# Patient Record
Sex: MALE | Race: WHITE | Employment: OTHER | URBAN - METROPOLITAN AREA
[De-identification: names, ages, dates, MRNs, and addresses within clinical notes are randomized per-mention and may not be internally consistent; named-entity substitution may affect disease eponyms.]

---

## 2017-03-15 PROBLEM — M77.8 TENDONITIS OF ELBOW, LEFT: Status: ACTIVE | Noted: 2017-03-15

## 2017-06-03 ENCOUNTER — LAB ENCOUNTER (OUTPATIENT)
Dept: LAB | Age: 67
End: 2017-06-03
Attending: FAMILY MEDICINE
Payer: MEDICARE

## 2017-06-03 DIAGNOSIS — Z13.6 SCREENING FOR HYPERTENSION: Primary | ICD-10-CM

## 2017-06-03 PROCEDURE — 36415 COLL VENOUS BLD VENIPUNCTURE: CPT

## 2017-06-03 PROCEDURE — 80061 LIPID PANEL: CPT

## 2017-06-03 PROCEDURE — 80053 COMPREHEN METABOLIC PANEL: CPT

## 2017-07-19 ENCOUNTER — TELEPHONE (OUTPATIENT)
Dept: FAMILY MEDICINE CLINIC | Facility: CLINIC | Age: 67
End: 2017-07-19

## 2017-07-19 NOTE — TELEPHONE ENCOUNTER
LMOM for pt to either call back or be here 15 min prior to appt to fill out Annual Health Assessment Form (form by daily checklist)

## 2017-07-25 ENCOUNTER — OFFICE VISIT (OUTPATIENT)
Dept: FAMILY MEDICINE CLINIC | Facility: CLINIC | Age: 67
End: 2017-07-25

## 2017-07-25 VITALS
WEIGHT: 150 LBS | RESPIRATION RATE: 14 BRPM | SYSTOLIC BLOOD PRESSURE: 118 MMHG | BODY MASS INDEX: 22.73 KG/M2 | HEART RATE: 72 BPM | HEIGHT: 68 IN | DIASTOLIC BLOOD PRESSURE: 68 MMHG | TEMPERATURE: 99 F

## 2017-07-25 DIAGNOSIS — I10 ESSENTIAL HYPERTENSION: ICD-10-CM

## 2017-07-25 DIAGNOSIS — Z00.00 ENCOUNTER FOR ANNUAL HEALTH EXAMINATION: Primary | ICD-10-CM

## 2017-07-25 DIAGNOSIS — M77.02 MEDIAL EPICONDYLITIS, LEFT: ICD-10-CM

## 2017-07-25 DIAGNOSIS — Z13.31 DEPRESSION SCREENING: ICD-10-CM

## 2017-07-25 DIAGNOSIS — Z23 NEED FOR VACCINATION: ICD-10-CM

## 2017-07-25 PROCEDURE — 90670 PCV13 VACCINE IM: CPT | Performed by: FAMILY MEDICINE

## 2017-07-25 PROCEDURE — G0439 PPPS, SUBSEQ VISIT: HCPCS | Performed by: FAMILY MEDICINE

## 2017-07-25 PROCEDURE — G0009 ADMIN PNEUMOCOCCAL VACCINE: HCPCS | Performed by: FAMILY MEDICINE

## 2017-07-25 PROCEDURE — G0444 DEPRESSION SCREEN ANNUAL: HCPCS | Performed by: FAMILY MEDICINE

## 2017-07-25 RX ORDER — LOSARTAN POTASSIUM 100 MG/1
100 TABLET ORAL DAILY
Qty: 90 TABLET | Refills: 3 | Status: SHIPPED | OUTPATIENT
Start: 2017-07-25 | End: 2017-07-27

## 2017-07-25 NOTE — PATIENT INSTRUCTIONS
Zacarias Walden's SCREENING SCHEDULE   Tests on this list are recommended by your physician but may not be covered, or covered at this frequency, by your insurer. Please check with your insurance carrier before scheduling to verify coverage.     PREVENT abnormal Colonoscopy,10 Years due on 12/15/2026 Update [facility] if applicable    Flex Sigmoidoscopy Screen  Covered every 5 years No results found for this or any previous visit. No flowsheet data found.      Fecal Occult Blood   Covered Annually Medicare does not cover unless Medically needed    Zoster (Not covered by Medicare Part B) No orders found for this or any previous visit.  This may be covered with your pharmacy  prescription benefits     Recommended Websites for Advanced Directives    htt

## 2017-07-25 NOTE — PROGRESS NOTES
HPI:   Noemí Priest is a 69year old male who presents for a Medicare Subsequent Annual Wellness visit (Pt already had Initial Annual Wellness). Preventative Screening  Colonoscopy - 10/2016. Normal.  No repeat needed.     Prostate - h/o cancer - s tablet by mouth daily. cetirizine (ZYRTEC ALLERGY) 10 MG Oral Tab Take 10 mg by mouth daily. MEDICAL INFORMATION:   He  has a past medical history of Cancer ([facility] [address] (4/2015) and Essential hypertension.     He  has a past surgical history that includes age   Head:  Normocephalic, without obvious abnormality, atraumatic   Eyes:  PERRL, conjunctiva/corneas clear, EOM's intact   Nose: Nares normal, septum midline, mucosa normal, no drainage or sinus tenderness   Throat: Lips, mucosa, and tongue normal; teet left  Given how active he is, suggested PT. Pt will pursue this. There is an [facility] near his house that should be able to provide some exercises, treatments for his elbow. If he needs referral, will call back and let me know.               Brian Leobardo Hearing Problems?: No    Vision Problems? : No    Difficulty walking?: No    Difficulty dressing or bathing?: No    Problems with daily activities? : No    Memory Problems?: No      Fall/Risk Assessment     Do you have 3 or more medical conditions?: 1-Ye Physical Exam only, or if medically necessary Electrocardiogram date    Colorectal Cancer Screening      Colonoscopy Screen every 10 years Colonoscopy,10 Years due on 12/15/2026 Update Health Maintenance if applicable    Flex Sigmoidoscopy Screen every 10 data found.

## 2017-07-26 ENCOUNTER — TELEPHONE (OUTPATIENT)
Dept: FAMILY MEDICINE CLINIC | Facility: CLINIC | Age: 67
End: 2017-07-26

## 2017-07-26 DIAGNOSIS — M77.02 MEDIAL EPICONDYLITIS OF ELBOW, LEFT: Primary | ICD-10-CM

## 2017-07-26 NOTE — TELEPHONE ENCOUNTER
Marlene from [place] [address] called requesting PT Orders for elbow.  Please fax orders to [phone]

## 2017-07-26 NOTE — TELEPHONE ENCOUNTER
[facility] called this morning to get PT order. Please sign pended order. Routed to Dr Caesar Kaiser.

## 2017-07-27 ENCOUNTER — TELEPHONE (OUTPATIENT)
Dept: FAMILY MEDICINE CLINIC | Facility: CLINIC | Age: 67
End: 2017-07-27

## 2017-07-27 DIAGNOSIS — I10 ESSENTIAL HYPERTENSION: ICD-10-CM

## 2017-07-27 RX ORDER — LOSARTAN POTASSIUM 100 MG/1
100 TABLET ORAL DAILY
Qty: 90 TABLET | Refills: 1 | Status: SHIPPED | OUTPATIENT
Start: 2017-07-27 | End: 2017-08-03

## 2017-07-27 NOTE — TELEPHONE ENCOUNTER
From: Deni Begum  Sent: 7/27/2017 8:31 AM CDT  Subject: Medication Renewal Request    Sharita Navarro.  Prince Moore would like a refill of the following medications:  losartan 100 MG Oral Tab Aureliano Pace MD]    Preferred pharmacy: [facility] - [place]

## 2017-08-03 DIAGNOSIS — I10 ESSENTIAL HYPERTENSION: ICD-10-CM

## 2017-08-03 RX ORDER — LOSARTAN POTASSIUM 100 MG/1
100 TABLET ORAL DAILY
Qty: 90 TABLET | Refills: 1 | Status: SHIPPED | OUTPATIENT
Start: 2017-08-03 | End: 2018-02-23

## 2017-08-03 NOTE — TELEPHONE ENCOUNTER
Patient was just at the [facility] and they do not have his Medication refilled. Please let him know what to do from here.

## 2017-08-14 ENCOUNTER — PATIENT MESSAGE (OUTPATIENT)
Dept: FAMILY MEDICINE CLINIC | Facility: CLINIC | Age: 67
End: 2017-08-14

## 2017-08-14 DIAGNOSIS — M77.00 GOLFER'S ELBOW, UNSPECIFIED LATERALITY: Primary | ICD-10-CM

## 2017-08-14 NOTE — TELEPHONE ENCOUNTER
From: Cody Abraham  To: Idell Klinefelter, MD  Sent: 8/14/2017 12:58 PM CDT  Subject: Non-Urgent Medical Question    August 14, 2017    Dr. Rebekah Angeles,  I am going to [facility] for physical therapy on my left elbow (golfer's elbow - interior) per y

## 2017-09-01 PROBLEM — M77.02 MEDIAL EPICONDYLITIS OF LEFT ELBOW: Status: ACTIVE | Noted: 2017-09-01

## 2017-11-13 ENCOUNTER — TELEPHONE (OUTPATIENT)
Dept: FAMILY MEDICINE CLINIC | Facility: CLINIC | Age: 67
End: 2017-11-13

## 2017-11-14 ENCOUNTER — OFFICE VISIT (OUTPATIENT)
Dept: FAMILY MEDICINE CLINIC | Facility: CLINIC | Age: 67
End: 2017-11-14

## 2017-11-14 VITALS
DIASTOLIC BLOOD PRESSURE: 80 MMHG | HEIGHT: 68.25 IN | WEIGHT: 154 LBS | SYSTOLIC BLOOD PRESSURE: 128 MMHG | HEART RATE: 60 BPM | RESPIRATION RATE: 16 BRPM | BODY MASS INDEX: 23.34 KG/M2 | TEMPERATURE: 98 F

## 2017-11-14 DIAGNOSIS — M62.830 BACK MUSCLE SPASM: Primary | ICD-10-CM

## 2017-11-14 PROCEDURE — 99213 OFFICE O/P EST LOW 20 MIN: CPT | Performed by: FAMILY MEDICINE

## 2017-11-14 RX ORDER — CYCLOBENZAPRINE HCL 10 MG
10 TABLET ORAL NIGHTLY
Qty: 30 TABLET | Refills: 0 | Status: SHIPPED | OUTPATIENT
Start: 2017-11-14 | End: 2017-12-04

## 2017-11-14 RX ORDER — INFLUENZA A VIRUSA/MICHIGAN/45/2015 X-275 (H1N1) ANTIGEN (FORMALDEHYDE INACTIVATED), INFLUENZA A VIRUS A/HONG KONG/4801/2014 X-263B (H3N2) ANTIGEN (FORMALDEHYDE INACTIVATED), AND INFLUENZA B VIRUS B/BRISBANE/60/2008 ANTIGEN (FORMALDEHYDE INACTIVATED) 60; 60; 60 UG/.5ML; UG/.5ML; UG/.5ML
INJECTION, SUSPENSION INTRAMUSCULAR
COMMUNITY
Start: 2017-09-21 | End: 2017-11-14

## 2017-11-14 NOTE — PROGRESS NOTES
Patient presents with:  Low Back Pain: x 2 weeks, pain in lower back concentrated on the left side, pain is constant but worse in the morning     HPI:   Ambrosio Rivera is a 69year old male who presents to the office for LBP.   About 2 and a half weeks a

## 2018-01-08 ENCOUNTER — HOSPITAL ENCOUNTER (OUTPATIENT)
Dept: MRI IMAGING | Age: 68
Discharge: HOME | End: 2018-01-08
Attending: PHYSICIAN ASSISTANT
Payer: MEDICARE

## 2018-01-08 DIAGNOSIS — M43.16 SPONDYLOLISTHESIS OF LUMBAR REGION: ICD-10-CM

## 2018-01-08 DIAGNOSIS — M47.816 LUMBAR ARTHROPATHY: ICD-10-CM

## 2018-01-08 PROCEDURE — 72148 MRI LUMBAR SPINE W/O DYE: CPT | Performed by: PHYSICIAN ASSISTANT

## 2018-01-18 PROBLEM — M71.38 SYNOVIAL CYST OF LUMBAR FACET JOINT: Status: ACTIVE | Noted: 2018-01-18

## 2018-01-18 PROBLEM — M47.816 LUMBAR SPONDYLOSIS: Status: ACTIVE | Noted: 2018-01-18

## 2018-01-27 ENCOUNTER — NURSE ONLY (OUTPATIENT)
Dept: FAMILY MEDICINE CLINIC | Facility: CLINIC | Age: 68
End: 2018-01-27

## 2018-01-27 VITALS
WEIGHT: 152 LBS | HEIGHT: 68 IN | SYSTOLIC BLOOD PRESSURE: 120 MMHG | TEMPERATURE: 98 F | RESPIRATION RATE: 18 BRPM | HEART RATE: 69 BPM | BODY MASS INDEX: 23.04 KG/M2 | OXYGEN SATURATION: 98 % | DIASTOLIC BLOOD PRESSURE: 72 MMHG

## 2018-01-27 DIAGNOSIS — J01.01 ACUTE RECURRENT MAXILLARY SINUSITIS: Primary | ICD-10-CM

## 2018-01-27 PROCEDURE — 99213 OFFICE O/P EST LOW 20 MIN: CPT | Performed by: PHYSICIAN ASSISTANT

## 2018-01-27 RX ORDER — METHYLPREDNISOLONE 4 MG/1
TABLET ORAL
Qty: 1 KIT | Refills: 0 | Status: SHIPPED | OUTPATIENT
Start: 2018-01-27 | End: 2018-06-27

## 2018-01-27 RX ORDER — AMOXICILLIN AND CLAVULANATE POTASSIUM 875; 125 MG/1; MG/1
1 TABLET, FILM COATED ORAL 2 TIMES DAILY
Qty: 20 TABLET | Refills: 0 | Status: SHIPPED | OUTPATIENT
Start: 2018-01-27 | End: 2018-02-06

## 2018-01-27 NOTE — PATIENT INSTRUCTIONS
1. Augmentin 875 mg twice daily for 10 days. Recommend probiotics while on this medication to prevent antibiotics associated diarrhea or yeast infections.   Examples include yogurt with active live cultures; or in capsule/granule forms such as Florastor, C · An expectorant containing guaifenesin may help thin the mucus and promote drainage from the sinuses. · Over-the-counter decongestants may be used unless a similar medicine was prescribed.  Nasal sprays work the fastest. Use one that contains phenylephrin © 6042-6356 The [address]. [address] [facility], [facility] [place]. All rights reserved. This information is not intended as a substitute for professional medical care. Always follow your healthcare professional's instructions.

## 2018-01-27 NOTE — PROGRESS NOTES
CHIEF COMPLAINT:   Patient presents with:  Sinus Problem: sinus pain and pressure x 1 week      HPI:   Verda Boxer is a 69year old male who presents for cold symptoms for  1  weeks.  Symptoms have progressed into sinus congestion and been worsening s Alcohol use: Yes           0.6 oz/week     Standard drinks or equivalent: 1 per week     Comment: 1 glass of wine a week        REVIEW OF SYSTEMS:   GENERAL:  denies  appetite  SKIN: no rashes or abnormal skin lesions  HEENT: See HPI.     LUNGS: denies shor Sig: As directed. Risks, benefits, side effects of medication addressed and explained. Patient Instructions   1. Augmentin 875 mg twice daily for 10 days.   Recommend probiotics while on this medication to prevent antibiotics associated diar · Heat may help soothe painful areas of the face. Use a towel soaked in hot water. Or,  the shower and direct the hot spray onto your face.  Using a vaporizer along with a menthol rub at night may also help.   · An expectorant containing guaifenesin · Vision problems, including blurred or double vision  · Fever of 100.4ºF (38ºC) or higher, or as directed by your healthcare provider  · Seizure  · Breathing problems  · Symptoms not resolving within 10 days  Date Last Reviewed: 4/13/2015  © 8057-3191 The

## 2018-02-08 ENCOUNTER — APPOINTMENT (OUTPATIENT)
Dept: PHYSICAL THERAPY | Age: 68
End: 2018-02-08
Payer: MEDICARE

## 2018-02-12 ENCOUNTER — OFFICE VISIT (OUTPATIENT)
Dept: PHYSICAL THERAPY | Age: 68
End: 2018-02-12
Attending: PHYSICIAN ASSISTANT
Payer: MEDICARE

## 2018-02-12 DIAGNOSIS — M43.16 SPONDYLOLISTHESIS OF LUMBAR REGION: ICD-10-CM

## 2018-02-12 DIAGNOSIS — M47.816 LUMBAR ARTHROPATHY: ICD-10-CM

## 2018-02-12 PROCEDURE — 97110 THERAPEUTIC EXERCISES: CPT

## 2018-02-12 PROCEDURE — 97161 PT EVAL LOW COMPLEX 20 MIN: CPT

## 2018-02-12 NOTE — PROGRESS NOTES
SPINE EVALUATION:   Referring Physician: Dr. Dyana Robbins  Diagnosis: left sided low back pain     Date of Service: 2/12/2018     PATIENT Jeison Newberry is a 69year old y/o male who presents to therapy today with complaints of left sided low back to address the above impairments to decrease pain, improve hip musculature flexibility, increase hip strength, improve neural tension flexibility.      Precautions:  None  OBJECTIVE:   Observation/Posture: he is an alert and oriented male, erect posture  Ga side  4) patient to demo 25 deg standing lumbar extension with less than 2/10 pain    Frequency / Duration: Patient will be seen for 2 x/week or a total of 8 visits over a 90 day period. Treatment will include: Manual Therapy; Therapeutic Exercises;  Neurom

## 2018-02-14 ENCOUNTER — OFFICE VISIT (OUTPATIENT)
Dept: PHYSICAL THERAPY | Age: 68
End: 2018-02-14
Attending: FAMILY MEDICINE
Payer: MEDICARE

## 2018-02-14 PROCEDURE — 97110 THERAPEUTIC EXERCISES: CPT

## 2018-02-14 PROCEDURE — 97140 MANUAL THERAPY 1/> REGIONS: CPT

## 2018-02-14 NOTE — PROGRESS NOTES
Dx: left sided mechanical low back pain, extension biased with neural tendion         Authorized # of Visits:  8         Next MD visit: none scheduled  Fall Risk: standard         Precautions: n/a             Subjective: feeling good, no pain today.  Jacy Reveles

## 2018-02-19 ENCOUNTER — OFFICE VISIT (OUTPATIENT)
Dept: PHYSICAL THERAPY | Age: 68
End: 2018-02-19
Attending: PHYSICIAN ASSISTANT
Payer: MEDICARE

## 2018-02-19 PROCEDURE — 97110 THERAPEUTIC EXERCISES: CPT

## 2018-02-19 PROCEDURE — 97140 MANUAL THERAPY 1/> REGIONS: CPT

## 2018-02-19 NOTE — PROGRESS NOTES
Dx: left sided mechanical low back pain, extension biased with neural tendion         Authorized # of Visits:  8         Next MD visit: none scheduled  Fall Risk: standard         Precautions: n/a           Subjective: feeling good, no pain today.  Wang o

## 2018-02-21 ENCOUNTER — OFFICE VISIT (OUTPATIENT)
Dept: PHYSICAL THERAPY | Age: 68
End: 2018-02-21
Attending: FAMILY MEDICINE
Payer: MEDICARE

## 2018-02-21 PROCEDURE — 97140 MANUAL THERAPY 1/> REGIONS: CPT

## 2018-02-21 PROCEDURE — 97110 THERAPEUTIC EXERCISES: CPT

## 2018-02-21 NOTE — PROGRESS NOTES
Dx: left sided mechanical low back pain, extension biased with neural tendion         Authorized # of Visits:  8         Next MD visit: none scheduled  Fall Risk: standard         Precautions: n/a           Subjective: feeling good, no pain today.  Doing th Total Timed Treatment: 40 min  Total Treatment Time: 40 plus 10 min [facility]

## 2018-02-23 DIAGNOSIS — I10 ESSENTIAL HYPERTENSION: ICD-10-CM

## 2018-02-23 RX ORDER — LOSARTAN POTASSIUM 100 MG/1
100 TABLET ORAL DAILY
Qty: 90 TABLET | Refills: 1 | Status: SHIPPED
Start: 2018-02-23 | End: 2018-07-27

## 2018-02-23 NOTE — TELEPHONE ENCOUNTER
From: Sergey Moran  Sent: 2/23/2018 10:41 AM CST  Subject: Medication Renewal Request    Tosha Bryant.  Nhung Pitts would like a refill of the following medications:     losartan 100 MG Oral Tab Wolf Singh MD]    Preferred pharmacy: [facility] - [place]

## 2018-02-26 ENCOUNTER — APPOINTMENT (OUTPATIENT)
Dept: PHYSICAL THERAPY | Age: 68
End: 2018-02-26
Payer: MEDICARE

## 2018-03-05 ENCOUNTER — OFFICE VISIT (OUTPATIENT)
Dept: PHYSICAL THERAPY | Age: 68
End: 2018-03-05
Attending: PHYSICIAN ASSISTANT
Payer: MEDICARE

## 2018-03-05 PROCEDURE — 97140 MANUAL THERAPY 1/> REGIONS: CPT

## 2018-03-05 PROCEDURE — 97110 THERAPEUTIC EXERCISES: CPT

## 2018-03-05 NOTE — PROGRESS NOTES
Dx: left sided mechanical low back pain, extension biased with neural tendion         Authorized # of Visits:  8         Next MD visit: none scheduled  Fall Risk: standard         Precautions: n/a           Subjective: he reports had been feeling great, 1/ TX#: 2/ Date:  2/19/2018               TX#: 3/   Date: 2/21/2018             TX#: 4/ Date: 3/5/2018              TX#: 5/ Date:               TX#: 6/ Date:               TX#: 7/ Date:               TX#: 8/   TM 3.0mph  3% incline  5 min --- Bike 5 min L3 hi

## 2018-05-03 ENCOUNTER — TELEPHONE (OUTPATIENT)
Dept: FAMILY MEDICINE CLINIC | Facility: CLINIC | Age: 68
End: 2018-05-03

## 2018-05-03 NOTE — TELEPHONE ENCOUNTER
Spoke with pt and he was in the driveway waiting for his grandchildren to get off the bus he will call back to schedule.

## 2018-06-27 ENCOUNTER — OFFICE VISIT (OUTPATIENT)
Dept: FAMILY MEDICINE CLINIC | Facility: CLINIC | Age: 68
End: 2018-06-27

## 2018-06-27 VITALS
OXYGEN SATURATION: 98 % | DIASTOLIC BLOOD PRESSURE: 88 MMHG | WEIGHT: 151 LBS | RESPIRATION RATE: 20 BRPM | BODY MASS INDEX: 22.62 KG/M2 | SYSTOLIC BLOOD PRESSURE: 138 MMHG | TEMPERATURE: 99 F | HEART RATE: 68 BPM | HEIGHT: 68.5 IN

## 2018-06-27 DIAGNOSIS — J01.00 ACUTE NON-RECURRENT MAXILLARY SINUSITIS: Primary | ICD-10-CM

## 2018-06-27 PROCEDURE — 99213 OFFICE O/P EST LOW 20 MIN: CPT | Performed by: PHYSICIAN ASSISTANT

## 2018-06-27 RX ORDER — METHYLPREDNISOLONE 4 MG/1
TABLET ORAL
Qty: 1 KIT | Refills: 0 | Status: SHIPPED | OUTPATIENT
Start: 2018-06-27 | End: 2018-07-27

## 2018-06-27 RX ORDER — AMOXICILLIN AND CLAVULANATE POTASSIUM 875; 125 MG/1; MG/1
1 TABLET, FILM COATED ORAL 2 TIMES DAILY
Qty: 20 TABLET | Refills: 0 | Status: SHIPPED | OUTPATIENT
Start: 2018-06-27 | End: 2018-07-07

## 2018-06-27 NOTE — PROGRESS NOTES
CHIEF COMPLAINT:   Patient presents with:  Sinus Problem: s/s for 7 days. used OTC meds      HPI:   Masha Weston is a 77year old male who presents for cold symptoms for  7  days. He feels like he is getting a sinus infection.   History of chronic sin Arthritis Neg    • Anesthesia Problems  Neg       Smoking status: Never Smoker                                                              Smokeless tobacco: Never Used                      Alcohol use: Yes           0.6 oz/week     Standard drinks or equ Pack 1 kit 0      Sig: As directed. Amoxicillin-Pot Clavulanate 875-125 MG Oral Tab 20 tablet 0      Sig: Take 1 tablet by mouth 2 (two) times daily. Risks, benefits, side effects of medication addressed and explained.     There are no Patien

## 2018-07-23 ENCOUNTER — TELEPHONE (OUTPATIENT)
Dept: FAMILY MEDICINE CLINIC | Facility: CLINIC | Age: 68
End: 2018-07-23

## 2018-07-27 ENCOUNTER — OFFICE VISIT (OUTPATIENT)
Dept: FAMILY MEDICINE CLINIC | Facility: CLINIC | Age: 68
End: 2018-07-27
Payer: MEDICARE

## 2018-07-27 VITALS — HEART RATE: 72 BPM | DIASTOLIC BLOOD PRESSURE: 70 MMHG | SYSTOLIC BLOOD PRESSURE: 126 MMHG | TEMPERATURE: 98 F

## 2018-07-27 DIAGNOSIS — R23.9 SKIN CHANGE: ICD-10-CM

## 2018-07-27 DIAGNOSIS — I10 ESSENTIAL HYPERTENSION: ICD-10-CM

## 2018-07-27 DIAGNOSIS — Z13.6 SCREENING FOR CARDIOVASCULAR CONDITION: ICD-10-CM

## 2018-07-27 DIAGNOSIS — Z13.31 DEPRESSION SCREENING: ICD-10-CM

## 2018-07-27 DIAGNOSIS — Z11.59 NEED FOR HEPATITIS C SCREENING TEST: ICD-10-CM

## 2018-07-27 DIAGNOSIS — C61 PROSTATE CANCER (HCC): ICD-10-CM

## 2018-07-27 DIAGNOSIS — Z00.00 ENCOUNTER FOR ANNUAL HEALTH EXAMINATION: Primary | ICD-10-CM

## 2018-07-27 PROCEDURE — G0439 PPPS, SUBSEQ VISIT: HCPCS | Performed by: FAMILY MEDICINE

## 2018-07-27 PROCEDURE — G0444 DEPRESSION SCREEN ANNUAL: HCPCS | Performed by: FAMILY MEDICINE

## 2018-07-27 RX ORDER — LOSARTAN POTASSIUM 100 MG/1
100 TABLET ORAL DAILY
Qty: 90 TABLET | Refills: 3 | Status: SHIPPED | OUTPATIENT
Start: 2018-07-27 | End: 2019-07-18

## 2018-07-27 NOTE — PATIENT INSTRUCTIONS
Charmaine Walden's SCREENING SCHEDULE   Tests on this list are recommended by your physician but may not be covered, or covered at this frequency, by your insurer. Please check with your insurance carrier before scheduling to verify coverage.     PREVENT abnormal There are no preventive care reminders to display for this patient. Update Health Maintenance if applicable    Flex Sigmoidoscopy Screen  Covered every 5 years No results found for this or any previous visit. No flowsheet data found.      Fecal Occ prescription benefits, but Medicare does not cover unless Medically needed    Zoster (Not covered by Medicare Part B) No orders found for this or any previous visit.  This may be covered with your pharmacy  prescription benefits     Recommended Websites for

## 2018-07-27 NOTE — PROGRESS NOTES
HPI:   Nasreen Greenberg is a 77year old male who presents for a Medicare Subsequent Annual Wellness visit (Pt already had Initial Annual Wellness). Preventative Screening  Colonoscopy - 10/2016. No repeat recommended.    Prostate - h/o cancer - sees u sinusitis     PND (post-nasal drip)     Prostate cancer (HCC)     HTN (hypertension)     Sprain of right sternoclavicular joint, subsequent encounter     Chronic right shoulder pain     Internal hemorrhoids     Tendonitis of elbow, left     Medial epicondy mouth, throat, and face: negative  Respiratory: negative  Cardiovascular: negative  Gastrointestinal: negative  Genitourinary: negative  Neurological: negative      EXAM:   /70   Pulse 72   Temp 98.2 °F (36.8 °C) (Oral)   Estimated body mass index is History   Administered Date(s) Administered   • Celestone Soluspan 3mg  03/15/2017   • Depo-Medrol 40mg Inj 09/01/2017, 02/22/2018   • Influenza Vaccine, High Dose, Preserv Free 10/13/2015, 09/27/2017   • Pneumococcal (Prevnar 13) 07/25/2017   • Pneumovax Family    This section provided for quick review of chart, separate sheet to patient  [address] Internal Lab or Procedure External Lab or Procedure   Diabetes Screening      HbgA1C   Annually No results found for: A1C TDaP Not covered by Medicare Part B) No vaccine history found This may be covered with your prescription benefits, but Medicare does not cover unless Medically needed    Zoster   Not covered by Medicare Part B No vaccine history found This may be covered w

## 2018-07-31 ENCOUNTER — APPOINTMENT (OUTPATIENT)
Dept: LAB | Facility: HOSPITAL | Age: 68
End: 2018-07-31
Attending: FAMILY MEDICINE
Payer: MEDICARE

## 2018-07-31 PROCEDURE — 80061 LIPID PANEL: CPT | Performed by: FAMILY MEDICINE

## 2018-07-31 PROCEDURE — 80053 COMPREHEN METABOLIC PANEL: CPT | Performed by: FAMILY MEDICINE

## 2018-07-31 PROCEDURE — 86803 HEPATITIS C AB TEST: CPT | Performed by: FAMILY MEDICINE

## 2019-01-07 ENCOUNTER — OFFICE VISIT (OUTPATIENT)
Dept: FAMILY MEDICINE CLINIC | Facility: CLINIC | Age: 69
End: 2019-01-07
Payer: MEDICARE

## 2019-01-07 VITALS
HEART RATE: 52 BPM | OXYGEN SATURATION: 98 % | TEMPERATURE: 98 F | SYSTOLIC BLOOD PRESSURE: 122 MMHG | DIASTOLIC BLOOD PRESSURE: 82 MMHG | WEIGHT: 155 LBS | HEIGHT: 68 IN | BODY MASS INDEX: 23.49 KG/M2 | RESPIRATION RATE: 20 BRPM

## 2019-01-07 DIAGNOSIS — J01.00 ACUTE MAXILLARY SINUSITIS, RECURRENCE NOT SPECIFIED: Primary | ICD-10-CM

## 2019-01-07 PROCEDURE — 99213 OFFICE O/P EST LOW 20 MIN: CPT | Performed by: PHYSICIAN ASSISTANT

## 2019-01-07 RX ORDER — AMOXICILLIN AND CLAVULANATE POTASSIUM 875; 125 MG/1; MG/1
1 TABLET, FILM COATED ORAL 2 TIMES DAILY
Qty: 20 TABLET | Refills: 0 | Status: SHIPPED | OUTPATIENT
Start: 2019-01-07 | End: 2019-01-17

## 2019-01-07 NOTE — PROGRESS NOTES
CHIEF COMPLAINT:   Patient presents with:  Sinus Problem: s/s for 7 days. OTC meds taken    HPI:   Carlyle Bloom is a 77year old male who presents for sinus congestion for  7  days. Symptoms have been worsening since onset.  Sinus congestion/pain is d LUNGS: denies shortness of breath or wheezing, See HPI  CARDIOVASCULAR: denies chest pain or palpitations   GI: denies constipation or abdominal pain  NEURO: No numbness or tingling in face.     EXAM:   /82   Pulse 52   Temp 97.5 °F (36.4 °C) ([place] Sinusitis can often be managed with self-care. Self-care can keep sinuses moist and make you feel more comfortable. Remember to follow your doctor's instructions closely. This can make a big difference in getting your sinus problem under control.   Drink fl Colds, flu, and allergies make it more likely for you to get sinusitis. Do your best to prevent sinusitis by preventing these problems. Do what you can to avoid getting colds and other infections. Stay away from things that cause allergies (allergens).  Teresa Ann · Stay away from all types of smoke, which dries out sinus linings. This includes tobacco smoke and chemical smoke in workplace settings. · Use saltwater rinses. Date Last Reviewed: 10/1/2016  © 2365-1907 The [address].  [facility] · You can use an over-the-counter decongestant, unless a similar medicine was prescribed to you. Nasal sprays work the fastest. Use one that contains phenylephrine or oxymetazoline. First blow your nose gently. Then use the spray.  Do not use these medicine · Don’t have close contact with people who have sore throats, colds, or other upper respiratory infections. · Don’t smoke, and stay away from secondhand smoke. · Stay up to date with of your vaccines.   Date Last Reviewed: 11/1/2017  © 5086-6345 The [employer]

## 2019-01-07 NOTE — PATIENT INSTRUCTIONS
Self-Care for Sinusitis     Drinking plenty of water can help sinuses drain. Sinusitis can often be managed with self-care. Self-care can keep sinuses moist and make you feel more comfortable. Remember to follow your doctor's instructions closely.  This Colds, flu, and allergies make it more likely for you to get sinusitis. Do your best to prevent sinusitis by preventing these problems. Do what you can to avoid getting colds and other infections. Stay away from things that cause allergies (allergens).  Nora Lucas · Stay away from all types of smoke, which dries out sinus linings. This includes tobacco smoke and chemical smoke in workplace settings. · Use saltwater rinses. Date Last Reviewed: 10/1/2016  © 8458-0231 The [address].  [facility] · You can use an over-the-counter decongestant, unless a similar medicine was prescribed to you. Nasal sprays work the fastest. Use one that contains phenylephrine or oxymetazoline. First blow your nose gently. Then use the spray.  Do not use these medicine · Don’t have close contact with people who have sore throats, colds, or other upper respiratory infections. · Don’t smoke, and stay away from secondhand smoke. · Stay up to date with of your vaccines.   Date Last Reviewed: 11/1/2017  © 4537-9911 The [employer]

## 2019-03-11 PROCEDURE — 81015 MICROSCOPIC EXAM OF URINE: CPT | Performed by: UROLOGY

## 2019-04-04 ENCOUNTER — OFFICE VISIT (OUTPATIENT)
Dept: FAMILY MEDICINE CLINIC | Facility: CLINIC | Age: 69
End: 2019-04-04
Payer: MEDICARE

## 2019-04-04 VITALS
HEIGHT: 68.5 IN | RESPIRATION RATE: 14 BRPM | WEIGHT: 157 LBS | DIASTOLIC BLOOD PRESSURE: 80 MMHG | SYSTOLIC BLOOD PRESSURE: 148 MMHG | TEMPERATURE: 98 F | HEART RATE: 72 BPM | BODY MASS INDEX: 23.52 KG/M2

## 2019-04-04 DIAGNOSIS — B02.9 HERPES ZOSTER WITHOUT COMPLICATION: Primary | ICD-10-CM

## 2019-04-04 PROCEDURE — 99213 OFFICE O/P EST LOW 20 MIN: CPT | Performed by: FAMILY MEDICINE

## 2019-04-04 RX ORDER — GABAPENTIN 300 MG/1
300 CAPSULE ORAL 3 TIMES DAILY PRN
Qty: 90 CAPSULE | Refills: 1 | Status: SHIPPED | OUTPATIENT
Start: 2019-04-04 | End: 2019-07-18

## 2019-04-04 RX ORDER — VALACYCLOVIR HYDROCHLORIDE 1 G/1
1 TABLET, FILM COATED ORAL 3 TIMES DAILY
Qty: 21 TABLET | Refills: 0 | Status: SHIPPED | OUTPATIENT
Start: 2019-04-04 | End: 2019-04-11

## 2019-04-04 RX ORDER — LIDOCAINE 50 MG/G
2 PATCH TOPICAL EVERY 24 HOURS
Qty: 60 PATCH | Refills: 1 | Status: SHIPPED | OUTPATIENT
Start: 2019-04-04 | End: 2019-05-31

## 2019-04-04 NOTE — PROGRESS NOTES
Patient presents with:  Back Pain: right side pt unable to sleep   Rash      Subjective   HPI:   This is a 77year old male coming in for pain, no prior kidney stones, but 3 days of pain, initially mild. Gradually worse, but severe last night. Right flank. Abdominal: Soft. Normal appearance and bowel sounds are normal. He exhibits no distension. There is no hepatosplenomegaly. There is no tenderness. There is no rebound, no guarding, no CVA tenderness and negative Mosley's sign.    Neurological: He is alert a

## 2019-04-23 PROBLEM — S76.111A STRAIN OF RIGHT QUADRICEPS MUSCLE: Status: ACTIVE | Noted: 2019-04-23

## 2019-04-23 PROBLEM — M76.891 PES ANSERINUS TENDINITIS OF RIGHT LOWER EXTREMITY: Status: ACTIVE | Noted: 2019-04-23

## 2019-05-01 PROBLEM — M25.561 RIGHT KNEE PAIN, UNSPECIFIED CHRONICITY: Status: ACTIVE | Noted: 2019-05-01

## 2019-05-04 ENCOUNTER — PATIENT MESSAGE (OUTPATIENT)
Dept: FAMILY MEDICINE CLINIC | Facility: CLINIC | Age: 69
End: 2019-05-04

## 2019-05-04 DIAGNOSIS — M54.50 CHRONIC LOW BACK PAIN, UNSPECIFIED BACK PAIN LATERALITY, UNSPECIFIED WHETHER SCIATICA PRESENT: Primary | ICD-10-CM

## 2019-05-04 DIAGNOSIS — G89.29 CHRONIC LOW BACK PAIN, UNSPECIFIED BACK PAIN LATERALITY, UNSPECIFIED WHETHER SCIATICA PRESENT: Primary | ICD-10-CM

## 2019-05-06 NOTE — TELEPHONE ENCOUNTER
From: Fredy Blanca  To: Sally Perea MD  Sent: 5/4/2019 7:30 PM CDT  Subject: Visit Follow-up Question    I am still having problem with my lower back even after PT and Chiro.  I made an appointment with [address] [place] [address] but the doctor I was going to

## 2019-05-21 PROBLEM — M23.91 INTERNAL DERANGEMENT OF KNEE, RIGHT: Status: ACTIVE | Noted: 2019-05-21

## 2019-05-31 ENCOUNTER — OFFICE VISIT (OUTPATIENT)
Dept: FAMILY MEDICINE CLINIC | Facility: CLINIC | Age: 69
End: 2019-05-31
Payer: MEDICARE

## 2019-05-31 VITALS
HEART RATE: 72 BPM | WEIGHT: 155 LBS | RESPIRATION RATE: 16 BRPM | TEMPERATURE: 98 F | DIASTOLIC BLOOD PRESSURE: 74 MMHG | SYSTOLIC BLOOD PRESSURE: 124 MMHG | BODY MASS INDEX: 23.22 KG/M2 | HEIGHT: 68.5 IN

## 2019-05-31 DIAGNOSIS — B02.29 HZV (HERPES ZOSTER VIRUS) POST HERPETIC NEURALGIA: Primary | ICD-10-CM

## 2019-05-31 PROCEDURE — 99213 OFFICE O/P EST LOW 20 MIN: CPT | Performed by: FAMILY MEDICINE

## 2019-05-31 RX ORDER — LIDOCAINE 50 MG/G
2 PATCH TOPICAL EVERY 24 HOURS
Qty: 60 PATCH | Refills: 1 | Status: SHIPPED | OUTPATIENT
Start: 2019-05-31 | End: 2020-08-19

## 2019-05-31 NOTE — PROGRESS NOTES
Patient presents with:  Shingles: dx shingles in april/ dx gabapentin/ severe  pain still persists on abdomen     HPI:   Jose Alejandro Fernandez is a 68year old male who presents to the office for shingles f/u. Diagnosed 4/4/19.   This was his third episode of

## 2019-07-08 ENCOUNTER — TELEPHONE (OUTPATIENT)
Dept: FAMILY MEDICINE CLINIC | Facility: CLINIC | Age: 69
End: 2019-07-08

## 2019-07-08 NOTE — TELEPHONE ENCOUNTER
Patient scheduled his medicare annual wellness, asked to complete his Medicare annual assessment he said he will do it online.

## 2019-07-12 ENCOUNTER — TELEPHONE (OUTPATIENT)
Dept: FAMILY MEDICINE CLINIC | Facility: CLINIC | Age: 69
End: 2019-07-12

## 2019-07-12 NOTE — TELEPHONE ENCOUNTER
Called patient and left message to contact the office to complete annual health assessment prior to appointment 07/18/19

## 2019-07-18 ENCOUNTER — OFFICE VISIT (OUTPATIENT)
Dept: FAMILY MEDICINE CLINIC | Facility: CLINIC | Age: 69
End: 2019-07-18
Payer: MEDICARE

## 2019-07-18 VITALS
BODY MASS INDEX: 22.72 KG/M2 | DIASTOLIC BLOOD PRESSURE: 66 MMHG | SYSTOLIC BLOOD PRESSURE: 126 MMHG | TEMPERATURE: 98 F | HEIGHT: 69.25 IN | RESPIRATION RATE: 16 BRPM | HEART RATE: 60 BPM | WEIGHT: 155.19 LBS

## 2019-07-18 DIAGNOSIS — Z13.6 SCREENING FOR CARDIOVASCULAR CONDITION: ICD-10-CM

## 2019-07-18 DIAGNOSIS — Z13.31 DEPRESSION SCREENING: ICD-10-CM

## 2019-07-18 DIAGNOSIS — I10 ESSENTIAL HYPERTENSION: ICD-10-CM

## 2019-07-18 DIAGNOSIS — Z00.00 ENCOUNTER FOR ANNUAL HEALTH EXAMINATION: Primary | ICD-10-CM

## 2019-07-18 PROBLEM — M25.561 RIGHT KNEE PAIN, UNSPECIFIED CHRONICITY: Status: RESOLVED | Noted: 2019-05-01 | Resolved: 2019-07-18

## 2019-07-18 PROBLEM — S76.111A STRAIN OF RIGHT QUADRICEPS MUSCLE: Status: RESOLVED | Noted: 2019-04-23 | Resolved: 2019-07-18

## 2019-07-18 PROBLEM — Z86.19 HISTORY OF SHINGLES: Status: ACTIVE | Noted: 2019-07-18

## 2019-07-18 PROBLEM — Z85.46 HISTORY OF PROSTATE CANCER: Status: ACTIVE | Noted: 2019-07-18

## 2019-07-18 PROBLEM — M77.8 TENDONITIS OF ELBOW, LEFT: Status: RESOLVED | Noted: 2017-03-15 | Resolved: 2019-07-18

## 2019-07-18 PROCEDURE — G0439 PPPS, SUBSEQ VISIT: HCPCS | Performed by: FAMILY MEDICINE

## 2019-07-18 PROCEDURE — G0444 DEPRESSION SCREEN ANNUAL: HCPCS | Performed by: FAMILY MEDICINE

## 2019-07-18 RX ORDER — LOSARTAN POTASSIUM 100 MG/1
100 TABLET ORAL DAILY
Qty: 90 TABLET | Refills: 3 | Status: SHIPPED | OUTPATIENT
Start: 2019-07-18 | End: 2021-09-14

## 2019-07-18 NOTE — PROGRESS NOTES
HPI:   Meghana Heredia is a 68year old male who presents for a Medicare Subsequent Annual Wellness visit (Pt already had Initial Annual Wellness). Preventative Screening  Colonoscopy - 10/2016. Normal, repeat 2026, but not indicated at that time. Physical Therapist    Patient Active Problem List:     Chronic sinusitis     PND (post-nasal drip)     HTN (hypertension)     Chronic right shoulder pain     Medial epicondylitis of left elbow     Lumbar spondylosis     Synovial cyst of lumbar facet joint that he drinks about 0.6 oz of alcohol per week. He reports that he does not use drugs.      REVIEW OF SYSTEMS:   Constitutional: negative  Eyes: negative  Ears, nose, mouth, throat, and face: negative  Respiratory: negative  Cardiovascular: negative  Angela Goldman lesions   Neurologic: Normal         Vaccination History     Immunization History   Administered Date(s) Administered   • Celestone Soluspan 3mg  03/15/2017   • Depo-Medrol 40mg Inj 09/01/2017, 02/22/2018, 10/03/2018   • FLU VACC High Dose 65 YRS & Older P Friends; Visiting Family    This section provided for quick review of chart, separate sheet to patient  [address] [place] [address],[address] Internal Lab or Procedure External Lab or Procedure   Diabetes Screening      HbgA1C   Annually No result Part B) No vaccine history found This may be covered with your prescription benefits, but Medicare does not cover unless Medically needed    Zoster   Not covered by Medicare Part B No vaccine history found This may be covered with your pharmacy  prescripti

## 2019-07-18 NOTE — PATIENT INSTRUCTIONS
Christene Buerger Costigan's SCREENING SCHEDULE   Tests on this list are recommended by your physician but may not be covered, or covered at this frequency, by your insurer. Please check with your insurance carrier before scheduling to verify coverage.     PREVENT Colonoscopy due on 12/15/2026 Update [facility] if applicable    Flex Sigmoidoscopy Screen  Covered every 5 years No results found for this or any previous visit. No flowsheet data found.      Fecal Occult Blood   Covered Annually No results found f cover unless Medically needed    Zoster (Not covered by Medicare Part B) No orders found for this or any previous visit. This may be covered with your pharmacy  prescription benefits     Recommended Websites for Advanced Directives    [email or web address]

## 2019-09-20 DIAGNOSIS — I10 ESSENTIAL HYPERTENSION: ICD-10-CM

## 2019-09-24 RX ORDER — LOSARTAN POTASSIUM 100 MG/1
100 TABLET ORAL DAILY
Qty: 90 TABLET | Refills: 3 | OUTPATIENT
Start: 2019-09-24

## 2020-02-15 ENCOUNTER — OFFICE VISIT (OUTPATIENT)
Dept: FAMILY MEDICINE CLINIC | Facility: CLINIC | Age: 70
End: 2020-02-15
Payer: MEDICARE

## 2020-02-15 VITALS
OXYGEN SATURATION: 100 % | BODY MASS INDEX: 22.66 KG/M2 | HEIGHT: 69 IN | HEART RATE: 74 BPM | TEMPERATURE: 99 F | RESPIRATION RATE: 16 BRPM | WEIGHT: 153 LBS | DIASTOLIC BLOOD PRESSURE: 78 MMHG | SYSTOLIC BLOOD PRESSURE: 126 MMHG

## 2020-02-15 DIAGNOSIS — J01.00 ACUTE NON-RECURRENT MAXILLARY SINUSITIS: Primary | ICD-10-CM

## 2020-02-15 PROCEDURE — 99213 OFFICE O/P EST LOW 20 MIN: CPT | Performed by: NURSE PRACTITIONER

## 2020-02-15 RX ORDER — AMOXICILLIN AND CLAVULANATE POTASSIUM 875; 125 MG/1; MG/1
1 TABLET, FILM COATED ORAL 2 TIMES DAILY
Qty: 20 TABLET | Refills: 0 | Status: SHIPPED | OUTPATIENT
Start: 2020-02-15 | End: 2020-02-25

## 2020-02-15 RX ORDER — PREDNISONE 20 MG/1
40 TABLET ORAL DAILY
Qty: 10 TABLET | Refills: 0 | Status: SHIPPED | OUTPATIENT
Start: 2020-02-15 | End: 2020-02-20

## 2020-02-15 NOTE — PROGRESS NOTES
CHIEF COMPLAINT:   No chief complaint on file. HPI:   Patrice Baxter is a 68year old male who presents for cold symptoms for  1  weeks. Symptoms have progressed into sinus congestion and been worsening since onset.  Sinus congestion/pain is describ • Ear Problems Neg    • Allergies Neg    • Cancer Neg    • Bleeding Disorders Neg    • Clotting Disorder Neg    • Thyroid disease Neg    • Hypertension Neg    • Asthma Neg    • Arthritis Neg    • Anesthesia Problems  Neg       Social History    Tobacco Use Acute non-recurrent maxillary sinusitis  (primary encounter diagnosis)      PLAN: Meds as below.   Comfort care instructions as listed in Patient Instructions  Educated on medication  Educated on supportive measures  Educated on s/s of worsening sx and when · An expectorant with guaifenesin may help thin nasal mucus and help your sinuses drain fluids. · You can use an over-the-counter decongestant, unless a similar medicine was prescribed to you.  Nasal sprays work the fastest. Use one that contains phenyleph Here are steps you can take to help prevent an infection:  · Keep good hand washing habits. · Don’t have close contact with people who have sore throats, colds, or other upper respiratory infections. · Don’t smoke, and stay away from secondhand smoke.   ·

## 2020-07-24 DIAGNOSIS — I10 ESSENTIAL HYPERTENSION: ICD-10-CM

## 2020-07-24 DIAGNOSIS — B02.29 HZV (HERPES ZOSTER VIRUS) POST HERPETIC NEURALGIA: ICD-10-CM

## 2020-07-27 ENCOUNTER — TELEPHONE (OUTPATIENT)
Dept: FAMILY MEDICINE CLINIC | Facility: CLINIC | Age: 70
End: 2020-07-27

## 2020-07-27 DIAGNOSIS — Z85.46 HISTORY OF PROSTATE CANCER: ICD-10-CM

## 2020-07-27 DIAGNOSIS — I10 ESSENTIAL HYPERTENSION: Primary | ICD-10-CM

## 2020-07-27 DIAGNOSIS — Z13.6 SCREENING FOR CARDIOVASCULAR CONDITION: ICD-10-CM

## 2020-07-27 RX ORDER — LIDOCAINE 50 MG/G
PATCH TOPICAL
Refills: 0 | OUTPATIENT
Start: 2020-07-27

## 2020-07-27 RX ORDER — CYCLOBENZAPRINE HCL 10 MG
TABLET ORAL
Qty: 30 TABLET | Refills: 0 | OUTPATIENT
Start: 2020-07-27

## 2020-07-27 RX ORDER — LOSARTAN POTASSIUM 100 MG/1
TABLET ORAL
Qty: 90 TABLET | Refills: 0 | OUTPATIENT
Start: 2020-07-27

## 2020-07-27 NOTE — TELEPHONE ENCOUNTER
Called patient and scheduled med visit and physical for 08/19 with Dr Grey Rhodes, states has enough medication to get to appt time.

## 2020-07-27 NOTE — TELEPHONE ENCOUNTER
Refill request for:    Requested Prescriptions     Pending Prescriptions Disp Refills   • LIDOCAINE 5 % External Patch [Pharmacy Med Name: Lidocaine 5 % Pad Acta]  0     Sig: APPLY TWO PATCHES EXTERNALLY ONCE DAILY   • CYCLOBENZAPRINE 10 MG Oral Tab [Pharm

## 2020-07-27 NOTE — TELEPHONE ENCOUNTER
Please enter lab orders for the patient's upcoming physical appointment. Physical scheduled:    Your appointments     Date & Time Appointment Department [facility])    Aug 19, 2020  7:30 AM CDT Physical - Established with MD Shakeel Campbell

## 2020-08-19 ENCOUNTER — OFFICE VISIT (OUTPATIENT)
Dept: FAMILY MEDICINE CLINIC | Facility: CLINIC | Age: 70
End: 2020-08-19
Payer: MEDICARE

## 2020-08-19 VITALS
TEMPERATURE: 98 F | SYSTOLIC BLOOD PRESSURE: 148 MMHG | DIASTOLIC BLOOD PRESSURE: 86 MMHG | HEART RATE: 64 BPM | WEIGHT: 155 LBS | HEIGHT: 69.5 IN | BODY MASS INDEX: 22.44 KG/M2 | RESPIRATION RATE: 16 BRPM

## 2020-08-19 DIAGNOSIS — G89.29 CHRONIC LOW BACK PAIN, UNSPECIFIED BACK PAIN LATERALITY, UNSPECIFIED WHETHER SCIATICA PRESENT: ICD-10-CM

## 2020-08-19 DIAGNOSIS — Z00.00 ENCOUNTER FOR ANNUAL HEALTH EXAMINATION: Primary | ICD-10-CM

## 2020-08-19 DIAGNOSIS — M54.50 CHRONIC LOW BACK PAIN, UNSPECIFIED BACK PAIN LATERALITY, UNSPECIFIED WHETHER SCIATICA PRESENT: ICD-10-CM

## 2020-08-19 DIAGNOSIS — Z13.31 DEPRESSION SCREENING: ICD-10-CM

## 2020-08-19 DIAGNOSIS — B02.29 HZV (HERPES ZOSTER VIRUS) POST HERPETIC NEURALGIA: ICD-10-CM

## 2020-08-19 DIAGNOSIS — I10 ESSENTIAL HYPERTENSION: ICD-10-CM

## 2020-08-19 PROCEDURE — G0444 DEPRESSION SCREEN ANNUAL: HCPCS | Performed by: FAMILY MEDICINE

## 2020-08-19 PROCEDURE — G0439 PPPS, SUBSEQ VISIT: HCPCS | Performed by: FAMILY MEDICINE

## 2020-08-19 RX ORDER — LIDOCAINE 50 MG/G
2 PATCH TOPICAL EVERY 24 HOURS
Qty: 60 PATCH | Refills: 1 | Status: SHIPPED | OUTPATIENT
Start: 2020-08-19

## 2020-08-19 RX ORDER — CYCLOBENZAPRINE HCL 10 MG
10 TABLET ORAL 3 TIMES DAILY PRN
Qty: 60 TABLET | Refills: 1 | Status: SHIPPED | OUTPATIENT
Start: 2020-08-19 | End: 2021-12-02

## 2020-08-19 RX ORDER — AMLODIPINE BESYLATE 5 MG/1
5 TABLET ORAL DAILY
Qty: 90 TABLET | Refills: 0 | Status: SHIPPED | OUTPATIENT
Start: 2020-08-19 | End: 2020-12-05

## 2020-08-19 NOTE — PROGRESS NOTES
HPI:   Esvin Obrien is a 65year old male who presents for a Medicare Subsequent Annual Wellness visit (Pt already had Initial Annual Wellness). Preventative Screening  Colonoscopy - 12/2016 - repeat 10 yrs.   2026  Prostate - h/o prostate CA s/p EB in [address] [place] [address] Rd.    Advance care planning including the explanation and discussion of advance directives standard forms performed Face to Face with patient and Family/surrogate (if present), and forms available to patient in AVS         He has never smoked tobacco. 1 tablet (5 mg total) by mouth daily. cyclobenzaprine 10 MG Oral Tab, Take 1 tablet (10 mg total) by mouth 3 (three) times daily as needed for Muscle spasms. lidocaine 5 % External Patch, Place 2 patches onto the skin daily.   Magnesium 100 MG Oral Tab, T Eye Chart Acuity: 20/30   Both Eyes Visual Acuity: Corrected Both Eyes Chart Acuity: 20/30   Able To Tolerate Visual Acuity: Yes      General Appearance:  Alert, cooperative, no distress, appears stated age   Head:  Normocephalic, without obvious abnormali examination  Overall healthy  Screening UTD, c-scope and vaccines  PSA with specialists  Working on BP control    2. Essential hypertension  High at home, high here  Already on loasrtan 100mg  Add amlodipine - not interested in diuretic.   Recheck at home found for this or any previous visit. No flowsheet data found. Fecal Occult Blood Annually No results found for: FOB No flowsheet data found.     Glaucoma Screening      Ophthalmology Visit Annually: Diabetics, FHx Glaucoma, AA>50, > 65 No flows

## 2020-08-19 NOTE — PATIENT INSTRUCTIONS
Christene Buerger Costigan's SCREENING SCHEDULE   Tests on this list are recommended by your physician but may not be covered, or covered at this frequency, by your insurer. Please check with your insurance carrier before scheduling to verify coverage.     PREVENT Colonoscopy due on 12/15/2026 Update [facility] if applicable    Flex Sigmoidoscopy Screen  Covered every 5 years No results found for this or any previous visit. No flowsheet data found.      Fecal Occult Blood   Covered Annually No results found f cover unless Medically needed    Zoster (Not covered by Medicare Part B) No orders found for this or any previous visit. This may be covered with your pharmacy  prescription benefits     Recommended Websites for Advanced Directives    [email or web address]

## 2020-09-24 ENCOUNTER — LABORATORY ENCOUNTER (OUTPATIENT)
Dept: LAB | Age: 70
End: 2020-09-24
Attending: FAMILY MEDICINE
Payer: MEDICARE

## 2020-09-24 DIAGNOSIS — I10 ESSENTIAL HYPERTENSION: ICD-10-CM

## 2020-09-24 DIAGNOSIS — Z85.46 HISTORY OF PROSTATE CANCER: ICD-10-CM

## 2020-09-24 DIAGNOSIS — Z13.6 SCREENING FOR CARDIOVASCULAR CONDITION: ICD-10-CM

## 2020-09-24 LAB
ALBUMIN SERPL-MCNC: 3.9 G/DL (ref 3.4–5)
ALBUMIN/GLOB SERPL: 1.3 {RATIO} (ref 1–2)
ALP LIVER SERPL-CCNC: 81 U/L
ALT SERPL-CCNC: 30 U/L
ANION GAP SERPL CALC-SCNC: 5 MMOL/L (ref 0–18)
AST SERPL-CCNC: 21 U/L (ref 15–37)
BILIRUB SERPL-MCNC: 0.6 MG/DL (ref 0.1–2)
BUN BLD-MCNC: 22 MG/DL (ref 7–18)
BUN/CREAT SERPL: 17.1 (ref 10–20)
CALCIUM BLD-MCNC: 8.9 MG/DL (ref 8.5–10.1)
CHLORIDE SERPL-SCNC: 109 MMOL/L (ref 98–112)
CHOLEST SMN-MCNC: 162 MG/DL (ref ?–200)
CO2 SERPL-SCNC: 29 MMOL/L (ref 21–32)
CREAT BLD-MCNC: 1.29 MG/DL
GLOBULIN PLAS-MCNC: 3 G/DL (ref 2.8–4.4)
GLUCOSE BLD-MCNC: 106 MG/DL (ref 70–99)
HDLC SERPL-MCNC: 70 MG/DL (ref 40–59)
LDLC SERPL CALC-MCNC: 83 MG/DL (ref ?–100)
M PROTEIN MFR SERPL ELPH: 6.9 G/DL (ref 6.4–8.2)
NONHDLC SERPL-MCNC: 92 MG/DL (ref ?–130)
OSMOLALITY SERPL CALC.SUM OF ELEC: 300 MOSM/KG (ref 275–295)
PATIENT FASTING Y/N/NP: YES
PATIENT FASTING Y/N/NP: YES
POTASSIUM SERPL-SCNC: 3.9 MMOL/L (ref 3.5–5.1)
PSA SERPL-MCNC: 0.33 NG/ML (ref ?–4)
SODIUM SERPL-SCNC: 143 MMOL/L (ref 136–145)
TRIGL SERPL-MCNC: 47 MG/DL (ref 30–149)
VLDLC SERPL CALC-MCNC: 9 MG/DL (ref 0–30)

## 2020-09-24 PROCEDURE — 84153 ASSAY OF PSA TOTAL: CPT

## 2020-09-24 PROCEDURE — 80061 LIPID PANEL: CPT

## 2020-09-24 PROCEDURE — 80053 COMPREHEN METABOLIC PANEL: CPT

## 2020-09-24 PROCEDURE — 36415 COLL VENOUS BLD VENIPUNCTURE: CPT

## 2020-09-30 ENCOUNTER — MED REC SCAN ONLY (OUTPATIENT)
Dept: FAMILY MEDICINE CLINIC | Facility: CLINIC | Age: 70
End: 2020-09-30

## 2020-12-05 DIAGNOSIS — I10 ESSENTIAL HYPERTENSION: ICD-10-CM

## 2020-12-08 RX ORDER — AMLODIPINE BESYLATE 5 MG/1
5 TABLET ORAL DAILY
Qty: 90 TABLET | Refills: 0 | Status: SHIPPED | OUTPATIENT
Start: 2020-12-08 | End: 2020-12-14

## 2020-12-08 NOTE — TELEPHONE ENCOUNTER
Requested Prescriptions     Pending Prescriptions Disp Refills   • amLODIPine Besylate 5 MG Oral Tab 90 tablet 0     Sig: Take 1 tablet (5 mg total) by mouth daily.      LOV 8/19/2020     Patient was asked to follow-up in: 3 months    Appointment scheduled:

## 2020-12-09 DIAGNOSIS — I10 ESSENTIAL HYPERTENSION: ICD-10-CM

## 2020-12-09 RX ORDER — AMLODIPINE BESYLATE 5 MG/1
TABLET ORAL
Qty: 90 TABLET | Refills: 0 | OUTPATIENT
Start: 2020-12-09

## 2020-12-09 RX ORDER — AMLODIPINE BESYLATE 5 MG/1
5 TABLET ORAL DAILY
Qty: 90 TABLET | Refills: 0 | OUTPATIENT
Start: 2020-12-09 | End: 2021-12-04

## 2020-12-14 ENCOUNTER — OFFICE VISIT (OUTPATIENT)
Dept: FAMILY MEDICINE CLINIC | Facility: CLINIC | Age: 70
End: 2020-12-14
Payer: MEDICARE

## 2020-12-14 VITALS
BODY MASS INDEX: 22.85 KG/M2 | DIASTOLIC BLOOD PRESSURE: 68 MMHG | RESPIRATION RATE: 16 BRPM | HEIGHT: 69 IN | HEART RATE: 70 BPM | WEIGHT: 154.25 LBS | SYSTOLIC BLOOD PRESSURE: 128 MMHG | TEMPERATURE: 97 F

## 2020-12-14 DIAGNOSIS — M71.38 SYNOVIAL CYST OF LUMBAR FACET JOINT: ICD-10-CM

## 2020-12-14 DIAGNOSIS — M46.96 UNSPECIFIED INFLAMMATORY SPONDYLOPATHY, LUMBAR REGION (HCC): ICD-10-CM

## 2020-12-14 DIAGNOSIS — I10 ESSENTIAL HYPERTENSION: Primary | ICD-10-CM

## 2020-12-14 PROCEDURE — 99213 OFFICE O/P EST LOW 20 MIN: CPT | Performed by: NURSE PRACTITIONER

## 2020-12-14 RX ORDER — AMLODIPINE BESYLATE 5 MG/1
5 TABLET ORAL DAILY
Qty: 90 TABLET | Refills: 1 | Status: SHIPPED | OUTPATIENT
Start: 2021-03-08 | End: 2021-08-30

## 2020-12-14 NOTE — PROGRESS NOTES
Nasreen Greenberg is a 65year old male presenting for follow up of HTN   Patient presents with:  Medication Request: blood pressure meds     HPI:       Patient presents for follow up of HTN. Stated taking medications as ordered, w/o reported side effects. Essential hypertension       Past Surgical History:   Procedure Laterality Date   • FACET INJECTION UNDER FLUOROSCOPY Left 6/26/2019    Performed by Cherylene Estimable, DO at [facility]   • [address]    on back.   done in [place] amLODIPine Besylate 5 MG Oral Tab 90 tablet 1     Sig: Take 1 tablet (5 mg total) by mouth daily. Imaging & Consults:  NEUROSURGERY - INTERNAL    No follow-ups on file. There are no Patient Instructions on file for this visit.

## 2021-07-20 ENCOUNTER — TELEPHONE (OUTPATIENT)
Dept: ORTHOPEDICS CLINIC | Facility: CLINIC | Age: 71
End: 2021-07-20

## 2021-07-20 ENCOUNTER — OFFICE VISIT (OUTPATIENT)
Dept: FAMILY MEDICINE CLINIC | Facility: CLINIC | Age: 71
End: 2021-07-20
Payer: MEDICARE

## 2021-07-20 VITALS
HEART RATE: 55 BPM | BODY MASS INDEX: 23.44 KG/M2 | SYSTOLIC BLOOD PRESSURE: 130 MMHG | DIASTOLIC BLOOD PRESSURE: 70 MMHG | OXYGEN SATURATION: 98 % | HEIGHT: 68.25 IN | RESPIRATION RATE: 17 BRPM | TEMPERATURE: 98 F | WEIGHT: 154.63 LBS

## 2021-07-20 DIAGNOSIS — S60.222A TRAUMATIC HEMATOMA OF LEFT HAND, INITIAL ENCOUNTER: ICD-10-CM

## 2021-07-20 DIAGNOSIS — Z85.46 HISTORY OF PROSTATE CANCER: ICD-10-CM

## 2021-07-20 DIAGNOSIS — Z13.6 SCREENING FOR CARDIOVASCULAR CONDITION: ICD-10-CM

## 2021-07-20 DIAGNOSIS — M25.561 RIGHT KNEE PAIN, UNSPECIFIED CHRONICITY: Primary | ICD-10-CM

## 2021-07-20 DIAGNOSIS — M79.651 RIGHT THIGH PAIN: Primary | ICD-10-CM

## 2021-07-20 PROCEDURE — 99213 OFFICE O/P EST LOW 20 MIN: CPT | Performed by: FAMILY MEDICINE

## 2021-07-20 NOTE — TELEPHONE ENCOUNTER
Patient coming in for Rt thigh to knee pain. Primarily on the Right knee. Patient had x-ray's and MRI done, can be viewed in Epic. If additional imaging needed please place Rx.     Future Appointments   Date Time Provider Galilea Cunningham   8/5/2021  8

## 2021-07-20 NOTE — PROGRESS NOTES
Patient presents with:  Bump: Left Hand, [id number]   Hernia: Right Inner Thigh, [id number]   Lab: PSA Retesting     HPI:   Verda Boxer is a 66year old male who presents to the office for thigh concern. PSA - interested in testing.       L hand - three [facility] 3  07/20/21

## 2021-08-05 ENCOUNTER — OFFICE VISIT (OUTPATIENT)
Dept: ORTHOPEDICS CLINIC | Facility: CLINIC | Age: 71
End: 2021-08-05
Payer: MEDICARE

## 2021-08-05 ENCOUNTER — HOSPITAL ENCOUNTER (OUTPATIENT)
Dept: GENERAL RADIOLOGY | Age: 71
Discharge: HOME | End: 2021-08-05
Attending: ORTHOPAEDIC SURGERY
Payer: MEDICARE

## 2021-08-05 VITALS — HEART RATE: 52 BPM | HEIGHT: 69 IN | OXYGEN SATURATION: 99 % | BODY MASS INDEX: 23.23 KG/M2 | WEIGHT: 156.81 LBS

## 2021-08-05 DIAGNOSIS — M79.651 ACUTE THIGH PAIN, RIGHT: Primary | ICD-10-CM

## 2021-08-05 DIAGNOSIS — M25.561 RIGHT KNEE PAIN, UNSPECIFIED CHRONICITY: ICD-10-CM

## 2021-08-05 PROCEDURE — 73564 X-RAY EXAM KNEE 4 OR MORE: CPT | Performed by: ORTHOPAEDIC SURGERY

## 2021-08-05 PROCEDURE — 99203 OFFICE O/P NEW LOW 30 MIN: CPT | Performed by: ORTHOPAEDIC SURGERY

## 2021-08-05 NOTE — H&P
[facility] New Patient Note    CC: Patient presents with:  Knee Pain: right knee to thigh pain       HPI: This 66year old male presents today with complaints of right knee pain.   When asked where this occurs, patient traces along the anteromedial as mouth.       • losartan 100 MG Oral Tab Take 1 tablet (100 mg total) by mouth daily. 90 tablet 3   • Montelukast Sodium (SINGULAIR OR) Take 10 mg by mouth daily.  Takes 1x weekly      • Multiple Vitamin (ONE-DAILY MULTI VITAMINS) Oral Tab Take 1 tablet by m medially. Nontender about IT band. • Range of motion: Full active extension of the knee, 5 out of 5 quad strength without pain discomfort or weakness.   Passive hip flexion and 90 with external rotation 45-50 is symmetric, internal rotation just past neut

## 2021-08-17 ENCOUNTER — LABORATORY ENCOUNTER (OUTPATIENT)
Dept: LAB | Age: 71
End: 2021-08-17
Attending: FAMILY MEDICINE
Payer: MEDICARE

## 2021-08-17 DIAGNOSIS — Z13.6 SCREENING FOR CARDIOVASCULAR CONDITION: ICD-10-CM

## 2021-08-17 DIAGNOSIS — Z85.46 HISTORY OF PROSTATE CANCER: ICD-10-CM

## 2021-08-17 LAB
ALBUMIN SERPL-MCNC: 3.7 G/DL (ref 3.4–5)
ALBUMIN/GLOB SERPL: 1.4 {RATIO} (ref 1–2)
ALP LIVER SERPL-CCNC: 80 U/L
ALT SERPL-CCNC: 31 U/L
ANION GAP SERPL CALC-SCNC: 1 MMOL/L (ref 0–18)
AST SERPL-CCNC: 25 U/L (ref 15–37)
BILIRUB SERPL-MCNC: 0.4 MG/DL (ref 0.1–2)
BUN BLD-MCNC: 22 MG/DL (ref 7–18)
CALCIUM BLD-MCNC: 8.9 MG/DL (ref 8.5–10.1)
CHLORIDE SERPL-SCNC: 112 MMOL/L (ref 98–112)
CHOLEST SMN-MCNC: 161 MG/DL (ref ?–200)
CO2 SERPL-SCNC: 29 MMOL/L (ref 21–32)
CREAT BLD-MCNC: 1.24 MG/DL
GLOBULIN PLAS-MCNC: 2.7 G/DL (ref 2.8–4.4)
GLUCOSE BLD-MCNC: 95 MG/DL (ref 70–99)
HDLC SERPL-MCNC: 66 MG/DL (ref 40–59)
LDLC SERPL CALC-MCNC: 86 MG/DL (ref ?–100)
M PROTEIN MFR SERPL ELPH: 6.4 G/DL (ref 6.4–8.2)
NONHDLC SERPL-MCNC: 95 MG/DL (ref ?–130)
OSMOLALITY SERPL CALC.SUM OF ELEC: 297 MOSM/KG (ref 275–295)
PATIENT FASTING Y/N/NP: NO
PATIENT FASTING Y/N/NP: NO
POTASSIUM SERPL-SCNC: 3.9 MMOL/L (ref 3.5–5.1)
PSA SERPL-MCNC: 0.46 NG/ML (ref ?–4)
SODIUM SERPL-SCNC: 142 MMOL/L (ref 136–145)
TRIGL SERPL-MCNC: 43 MG/DL (ref 30–149)
VLDLC SERPL CALC-MCNC: 7 MG/DL (ref 0–30)

## 2021-08-17 PROCEDURE — 36415 COLL VENOUS BLD VENIPUNCTURE: CPT

## 2021-08-17 PROCEDURE — 80053 COMPREHEN METABOLIC PANEL: CPT

## 2021-08-17 PROCEDURE — 80061 LIPID PANEL: CPT

## 2021-08-17 PROCEDURE — 84153 ASSAY OF PSA TOTAL: CPT

## 2021-08-28 DIAGNOSIS — I10 ESSENTIAL HYPERTENSION: ICD-10-CM

## 2021-08-30 RX ORDER — AMLODIPINE BESYLATE 5 MG/1
5 TABLET ORAL DAILY
Qty: 90 TABLET | Refills: 0 | Status: SHIPPED | OUTPATIENT
Start: 2021-08-30 | End: 2021-12-06

## 2021-08-30 NOTE — TELEPHONE ENCOUNTER
Requested Prescriptions     Pending Prescriptions Disp Refills   • AMLODIPINE 5 MG Oral Tab [Pharmacy Med Name: Amlodipine Besylate 5 Mg Tab Asce] 90 tablet 0     Sig: Take 1 tablet (5 mg total) by mouth daily.      LOV 7/20/2021     Patient was asked to fo

## 2021-09-01 ENCOUNTER — OFFICE VISIT (OUTPATIENT)
Dept: PHYSICAL THERAPY | Age: 71
End: 2021-09-01
Attending: ORTHOPAEDIC SURGERY
Payer: MEDICARE

## 2021-09-01 DIAGNOSIS — M79.651 ACUTE THIGH PAIN, RIGHT: ICD-10-CM

## 2021-09-01 PROCEDURE — 97161 PT EVAL LOW COMPLEX 20 MIN: CPT

## 2021-09-01 PROCEDURE — 97110 THERAPEUTIC EXERCISES: CPT

## 2021-09-01 NOTE — PROGRESS NOTES
LOWER EXTREMITY EVALUATION:   Referring Physician: Dr. Emily Lennon  Diagnosis: Acute thigh pain, right (K53.228)  PT Dx: acute R adductor/quad strain    Date of Service: 9/1/2021     PATIENT SUMMARY   Noemí Priest is a 66year old male who presents to states his HS are tight. Has difficulty bending to stretch HS because he is more limited by low back tightness. He states he does his exercises and stretches for the low back- flexion biased on a daily basis for 15 min before golf. This helps.       Relie passive hip flexion, impaired strength testing to hip ER and hip flexion. Functional deficits include but are not limited to stairs, seated active hip flexion against gravity to put leg in and out of bed/car.   Signs and symptoms are consistent with diagno Apprehension: R -, L -  Scour Test: R -, L -  FAIR: negative  Jarred Test: R impaired, L impaired  BRITNEY: negative BL  Haleigh's Test: R *impaired, tightness, LBP, L *slightly tight    Gait: pt ambulates on level ground with normal mechanics    Today’s Treatm Potential:good    FOTO: 72/100    Patient/Family/Caregiver was advised of these findings, precautions, and treatment options and has agreed to actively participate in planning and for this course of care.     Thank you for your referral. Please co-sign or s

## 2021-09-07 ENCOUNTER — APPOINTMENT (OUTPATIENT)
Dept: PHYSICAL THERAPY | Age: 71
End: 2021-09-07
Attending: ORTHOPAEDIC SURGERY
Payer: MEDICARE

## 2021-09-08 ENCOUNTER — TELEPHONE (OUTPATIENT)
Dept: FAMILY MEDICINE CLINIC | Facility: CLINIC | Age: 71
End: 2021-09-08

## 2021-09-09 ENCOUNTER — OFFICE VISIT (OUTPATIENT)
Dept: PHYSICAL THERAPY | Age: 71
End: 2021-09-09
Attending: ORTHOPAEDIC SURGERY
Payer: MEDICARE

## 2021-09-09 PROCEDURE — 97110 THERAPEUTIC EXERCISES: CPT

## 2021-09-09 NOTE — PROGRESS NOTES
Dx: Acute thigh pain, right (M79.651)  PT Dx: acute R adductor/quad strain           Insurance (Authorized # of Visits):  Medicare           Authorizing Physician: Dr. Jeni Donovan  Next MD visit: none scheduled  Fall Risk: standard         Precautions: n/a Shuttle single leg press level 3 15 reps x 2 sets on the R;    SLR 10 reps x 2 sets            Pt education: HEP update              HEP:heat before activity, ice after activity, avoiding painful activities, avoiding \"finding\" the pain, Avoiding DTM.

## 2021-09-14 ENCOUNTER — OFFICE VISIT (OUTPATIENT)
Dept: PHYSICAL THERAPY | Age: 71
End: 2021-09-14
Attending: ORTHOPAEDIC SURGERY
Payer: MEDICARE

## 2021-09-14 ENCOUNTER — OFFICE VISIT (OUTPATIENT)
Dept: FAMILY MEDICINE CLINIC | Facility: CLINIC | Age: 71
End: 2021-09-14
Payer: MEDICARE

## 2021-09-14 VITALS
HEART RATE: 72 BPM | SYSTOLIC BLOOD PRESSURE: 130 MMHG | BODY MASS INDEX: 23.05 KG/M2 | HEIGHT: 69 IN | DIASTOLIC BLOOD PRESSURE: 70 MMHG | RESPIRATION RATE: 14 BRPM | WEIGHT: 155.63 LBS

## 2021-09-14 DIAGNOSIS — I10 ESSENTIAL HYPERTENSION: ICD-10-CM

## 2021-09-14 DIAGNOSIS — Z13.31 DEPRESSION SCREENING: ICD-10-CM

## 2021-09-14 DIAGNOSIS — Z85.46 HISTORY OF PROSTATE CANCER: ICD-10-CM

## 2021-09-14 DIAGNOSIS — Z00.00 ENCOUNTER FOR ANNUAL HEALTH EXAMINATION: Primary | ICD-10-CM

## 2021-09-14 PROCEDURE — G0444 DEPRESSION SCREEN ANNUAL: HCPCS | Performed by: FAMILY MEDICINE

## 2021-09-14 PROCEDURE — 97110 THERAPEUTIC EXERCISES: CPT

## 2021-09-14 PROCEDURE — G0439 PPPS, SUBSEQ VISIT: HCPCS | Performed by: FAMILY MEDICINE

## 2021-09-14 RX ORDER — ACETAMINOPHEN AND CODEINE PHOSPHATE 300; 30 MG/1; MG/1
1 TABLET ORAL EVERY 4 HOURS PRN
COMMUNITY
Start: 2021-09-07

## 2021-09-14 NOTE — PATIENT INSTRUCTIONS
Zacarias Walden's SCREENING SCHEDULE   Tests on this list are recommended by your physician but may not be covered, or covered at this frequency, by your insurer. Please check with your insurance carrier before scheduling to verify coverage.    PREVEN vaccine (Zdildyn37 & Ilokwunmg20) covered once after 65 Prevnar 13: 07/25/2017    [id number]: 10/13/2015     No recommendations at this time    Hepatitis B One screening covered for patients with certain risk factors   -  No recommendations at this time

## 2021-09-14 NOTE — PROGRESS NOTES
HPI:   Kyle Hanks is a 66year old male who presents for a Medicare Subsequent Annual Wellness visit (Pt already had Initial Annual Wellness). Preventative Screening  Colonoscopy - 2016. Repeat 10 yrs.    Prostate - 0.46. h/o prostate CA 7 yrs ag Beryle Hampshire (Physician Assistant)  Benjamin Francoisr, PT as Physical Therapist  Lalo Ngo, PT as Physical Therapist    Patient Active Problem List:     Chronic sinusitis     PND (post-nasal drip)     HTN (hypertension)     Chronic right shoulder candida Takes 1x weekly   Multiple Vitamin (ONE-DAILY MULTI VITAMINS) Oral Tab, Take 1 tablet by mouth daily. cetirizine 10 MG Oral Tab, Take 10 mg by mouth daily.        MEDICAL INFORMATION:   He  has a past medical history of Cancer ([facility] [address] (4/2015) and Essential tenderness   Lungs:   Clear to auscultation bilaterally, respirations unlabored   Chest Wall:  No tenderness or deformity   Heart:  Regular rate and rhythm, S1, S2 normal, no murmur, rub or gallop   Abdomen:   Soft, non-tender, bowel sounds active all four exercise. No follow-ups on file.      Urmila Arnett MD, 9/14/2021     General Health     In the past six months, have you lost more than 10 pounds without trying?: 2 - No  Has your appetite been poor?: No  How does the patient maintain a good energy l Colonoscopy   Covered every 10 years    Covered every 2 years if patient is at high risk or previous colonoscopy was abnormal 12/15/2016    Colonoscopy due on 12/15/2026    Flexible Sigmoidoscopy   Covered every 4 years -    Fecal Occult Blood Test Covered

## 2021-09-14 NOTE — PROGRESS NOTES
Dx: Acute thigh pain, right (M79.651)  PT Dx: acute R adductor/quad strain           Insurance (Authorized # of Visits):  Medicare           Authorizing Physician: Dr. Corbin Mcardle Next MD visit: none scheduled  Fall Risk: standard         Precautions: n/a with Posterior pelvic tilts 10 reps x 2 sets   Shuttle single leg press level 3 15 reps x 2 sets on the R;    SLR 10 reps x 2 sets      There ex:   SLR 2# ankle weight 20 reps on the R   Swiss ball rolls 20 reps;   Swiss ball straight leg bridge 10 reps x

## 2021-09-21 ENCOUNTER — OFFICE VISIT (OUTPATIENT)
Dept: PHYSICAL THERAPY | Age: 71
End: 2021-09-21
Attending: ORTHOPAEDIC SURGERY
Payer: MEDICARE

## 2021-09-21 PROCEDURE — 97110 THERAPEUTIC EXERCISES: CPT

## 2021-09-21 NOTE — PROGRESS NOTES
Dx: Acute thigh pain, right (M79.651)  PT Dx: acute R adductor/quad strain           Insurance (Authorized # of Visits):  Medicare           Authorizing Physician: Dr. Jocelin Zamudio MD visit: none scheduled  Fall Risk: standard         Precautions: n/a There ex:   Hip adduction 10 reps  PIriformis stretch 30 sec hold x 3 sets on the R/L   Hip flexor- mod emmy stretch 30 sec hold x 3 sets on the R   Bridge 10 reps   Marches with Posterior pelvic tilts 10 reps x 2 sets   Shuttle single leg press level with Resistance Loop - 1 x daily - 7 x weekly - 3 sets - 10 reps  Hip Extension with Resistance Loop - 1 x daily - 7 x weekly - 3 sets - 10 reps  Standing Hip Flexion with Resistance Loop - 1 x daily - 7 x weekly - 3 sets - 10 reps    Charges:  There ex: 3

## 2021-09-22 ENCOUNTER — TELEPHONE (OUTPATIENT)
Dept: PHYSICAL THERAPY | Facility: HOSPITAL | Age: 71
End: 2021-09-22

## 2021-09-23 ENCOUNTER — APPOINTMENT (OUTPATIENT)
Dept: PHYSICAL THERAPY | Age: 71
End: 2021-09-23
Attending: ORTHOPAEDIC SURGERY
Payer: MEDICARE

## 2021-09-30 ENCOUNTER — APPOINTMENT (OUTPATIENT)
Dept: PHYSICAL THERAPY | Age: 71
End: 2021-09-30
Attending: ORTHOPAEDIC SURGERY
Payer: MEDICARE

## 2021-10-06 ENCOUNTER — OFFICE VISIT (OUTPATIENT)
Dept: ORTHOPEDICS CLINIC | Facility: CLINIC | Age: 71
End: 2021-10-06
Payer: MEDICARE

## 2021-10-06 VITALS — WEIGHT: 157 LBS | OXYGEN SATURATION: 99 % | BODY MASS INDEX: 22.73 KG/M2 | HEIGHT: 69.5 IN | HEART RATE: 59 BPM

## 2021-10-06 DIAGNOSIS — M76.891 TENDINITIS OF RIGHT HIP FLEXOR: Primary | ICD-10-CM

## 2021-10-06 PROCEDURE — 99213 OFFICE O/P EST LOW 20 MIN: CPT | Performed by: ORTHOPAEDIC SURGERY

## 2021-10-06 NOTE — PROGRESS NOTES
[facility] Progress Note     Subjective: Patient returns to clinic for his right hip.   He states that the medial thigh and knee pain has gotten better with therapy, but he had to stop therapy due to new onset of pain anteriorly in the proximal thigh

## 2021-10-07 ENCOUNTER — OFFICE VISIT (OUTPATIENT)
Dept: PHYSICAL THERAPY | Age: 71
End: 2021-10-07
Attending: SURGERY
Payer: MEDICARE

## 2021-10-07 PROCEDURE — 97110 THERAPEUTIC EXERCISES: CPT

## 2021-10-07 NOTE — PROGRESS NOTES
Dx: Acute thigh pain, right (M79.651)  PT Dx: acute R adductor/quad strain           Insurance (Authorized # of Visits):  Medicare           Authorizing Physician: Dr. Corbin Mcardle Next MD visit: none scheduled  Fall Risk: standard         Precautions: n/a compliant with comprehensive HEP to maintain progress achieved in PT     Plan: Continue per plan of care.  Plan for next therapy session:continue strengthening progression for eccentric control on stairs as well as hip flexor and glut strengthening   Date: on slant   Step ups 6 inch 10 reps R/L x 2 sets   Step down eccentric and retro 10 reps R/L x 2 sets       Pt education: HEP update Pt education: HEP update.               HEP:heat before activity, ice after activity, avoiding painful activities, avoiding \

## 2021-10-13 ENCOUNTER — OFFICE VISIT (OUTPATIENT)
Dept: PHYSICAL THERAPY | Age: 71
End: 2021-10-13
Attending: SURGERY
Payer: MEDICARE

## 2021-10-13 PROCEDURE — 97110 THERAPEUTIC EXERCISES: CPT

## 2021-10-13 NOTE — PROGRESS NOTES
Dx: Acute thigh pain, right (M79.651)  PT Dx: acute R adductor/quad strain           Insurance (Authorized # of Visits):  Medicare           Authorizing Physician: Dr. Holli Stone  Next MD visit: none scheduled  Fall Risk: standard         Precautions: n/a 10 reps  PIriformis stretch 30 sec hold x 3 sets on the R/L   Hip flexor- mod emmy stretch 30 sec hold x 3 sets on the R   Bridge 10 reps   Marches with Posterior pelvic tilts 10 reps x 2 sets   Shuttle single leg press level 3 15 reps x 2 sets on the R; avoiding painful activities, avoiding \"finding\" the pain, Avoiding DTM. Access Code: [email or web address]  URL: [email or web address]  Date: 10/13/2021  Prepared by: Scott Menendez    Exercises  Supine Active Straight Leg Raise - 1 x daily - 7 x weekly -

## 2021-11-04 ENCOUNTER — OFFICE VISIT (OUTPATIENT)
Dept: PHYSICAL THERAPY | Age: 71
End: 2021-11-04
Attending: ORTHOPAEDIC SURGERY
Payer: MEDICARE

## 2021-11-04 PROCEDURE — 97110 THERAPEUTIC EXERCISES: CPT

## 2021-11-04 NOTE — PROGRESS NOTES
Dx: Acute thigh pain, right (M79.651)  PT Dx: acute R adductor/quad strain           Insurance (Authorized # of Visits):  Medicare           Authorizing Physician: Dr. Dick Elkins  Next MD visit: none scheduled  Fall Risk: standard         Precautions: n/a and compliant with comprehensive HEP to maintain progress achieved in PT     Plan: Continue per plan of care after 3-4 weeks of indep HEP strengthening. Recommending additional 2-3 follow ups for monitoring and progressing indep HEP.    Patient/Family/Careg way hip 10 reps R/L yellow band at ankles- no to light UE assist only   Double leg heel raises 10 reps; Single leg 10 reps R/L   SLS 30 sec hold R/L There ex:   SLR with VCs for abdominal bracing and CL hip flexion 10 reps pain free x 2 sets on the R   Str x weekly - 3 sets - 10 reps   Figure 4 Bridge - 1 x daily - 7 x weekly - 3 sets - 10 reps   Hip Flexor Stretch at Edge of Bed - 1 x daily - 7 x weekly - 3 sets - 1 reps - 30 hold   Mini Squat with Counter Support - 1 x daily - 7 x weekly - 3 sets - 10 reps

## 2021-11-17 NOTE — TELEPHONE ENCOUNTER
Labs have been ordered. Please get them fasting prior to the appt.   thanks Patient: Lex Dan Date of Service: 2021   : 1952 MRN: [id number]       Reason For Visit  Lex Dan is a 69 year old male who presents today for hypertension follow up.  Chief Complaint   Patient presents with   • Follow-up     HTN           HPI     #essential hypertension  -On benazepirl 20mg daily  -denies dizziness, lightheadedness, headaches, chest pain, shortness of breath, changes in vision or loss of vision   -states he does bowling, swimming for 1 hour and walking.   -about 4 days a week does cardio; mostly walking    Reviewed lab work with patient; hgA1c 5.7, Total cholesterol 255, , : declines starting statin  HGB: 17.2    Review of Systems  Review of Systems   Constitutional: Negative for activity change, appetite change, chills, fatigue and fever.   HENT: Negative for congestion, ear pain and rhinorrhea.    Eyes: Negative for pain and redness.   Respiratory: Negative for cough and shortness of breath.    Cardiovascular: Negative for chest pain and palpitations.   Gastrointestinal: Negative for abdominal pain, blood in stool, diarrhea, nausea and vomiting.   Genitourinary: Negative for difficulty urinating, dysuria, hematuria, penile discharge, penile pain, penile swelling and urgency.   Musculoskeletal: Negative for arthralgias and back pain.   Skin: Negative for pallor and rash.   Neurological: Negative for dizziness, seizures, speech difficulty, light-headedness, numbness and headaches.   Psychiatric/Behavioral: Negative for agitation.       Allergy  ALLERGIES:   Allergen Reactions   • Dust Mite Extract Other (See Comments)     Sneezing, coughing, runny nose   • Seasonal Other (See Comments)     Sneezing, runny nose       Medications  Current Outpatient Medications   Medication Sig Dispense Refill   • budesonide-formoterol (SYMBICORT) 160-4.5 MCG/ACT inhaler Inhale 2 puffs into the lungs 2 times daily. 10.2 g 5   • tamsulosin (FLOMAX) 0.4 MG Cap TAKE 1 CAPSULE BY MOUTH  DAILY AFTER BREAKFAST 90 capsule 0   • albuterol 108 (90 Base) MCG/ACT inhaler INHALE 2 PUFFS EVERY 4-6 HOURS AS NEEDED     • Cetirizine HCl (ZYRTEC ALLERGY PO)      • benazepril (LOTENSIN) 20 MG tablet TAKE 1 TABLET BY MOUTH DAILY 90 tablet 1   • montelukast (SINGULAIR) 10 MG tablet Take 10 mg by mouth daily.     • fluticasone (FLONASE) 50 MCG/ACT nasal spray Spray 1 spray in each nostril daily.        No current facility-administered medications for this visit.       Past Medical History  Active Ambulatory Problems     Diagnosis Date Noted   • Essential hypertension 12/21/2019   • Hyperlipidemia 12/21/2019   • Allergic rhinitis 12/21/2019   • Community acquired pneumonia of right middle lobe of lung 08/12/2021     Resolved Ambulatory Problems     Diagnosis Date Noted   • Major depressive disorder, single episode, moderate (CMS/HCC) 06/19/2020     Past Medical History:   Diagnosis Date   • History of colonoscopy 01/012011       Surgical History  Past Surgical History:   Procedure Laterality Date   • Knee surgery         Family History  Family History   Problem Relation Age of Onset   • Alcohol Abuse Other    • Coronary Artery Disease Other        Social History  Social History     Tobacco Use   • Smoking status: Never Smoker   • Smokeless tobacco: Never Used   Vaping Use   • Vaping Use: never used   Substance Use Topics   • Alcohol use: Yes     Alcohol/week: 7.0 - 14.0 standard drinks     Types: 7 - 14 Glasses of wine per week   • Drug use: Never        Physical Exam  Vitals:    11/17/21 1018   BP: 122/80   BP Location: LUE - Left upper extremity   Patient Position: Sitting   Cuff Size: Regular   Pulse: 68   SpO2: 98%   Weight: 104.5 kg (230 lb 6.1 oz)   Height: 5' 10\" (1.778 m)     Body mass index is 33.06 kg/m².    Physical Exam  Gen:  Alert and oriented x3, NAD, well appearing  HEENT:  normocephalic, atraumatic.  No conjunctival discharge.   PERRLA, EOMI, exam of the mouth and nose deferred due to COVID-19  pandemic  Neck:  No  thyromegaly, or thyroid nodules. No cervical lymphadenopathy  CV:  RRR, S1, S2, no m/r/g.  No pedal edema.  2+ peripheral pulses  Chest:  Normal appearance  Pulm:  CTA, no w/r/r  Abd:  Soft, NT, ND, NABS.  No HSM  MS:  Normal gait.  No effusions present  Neuro:  Alert  Skin:   No rashes or ulcerations.  No concerning skin lesions.  Psych:  Speech fluent and appropriate.  Mentation appropriate.  Normal eye contact       Assessment/Plan    Essential hypertension  - LIPID PANEL WITH REFLEX; Future  - COMPREHENSIVE METABOLIC PANEL; Future  - GLYCOHEMOGLOBIN; Future  - CBC WITH DIFFERENTIAL; Future  - benazepril (LOTENSIN) 20 MG tablet; Take 1 tablet by mouth daily.  Dispense: 90 tablet; Refill: 1    Hyperlipidemia LDL goal <130  - LIPID PANEL WITH REFLEX; Future    Elevated LFTs  - COMPREHENSIVE METABOLIC PANEL; Future    Prediabetes  - LIPID PANEL WITH REFLEX; Future    Elevated hemoglobin (CMS/HCC)  - CBC WITH DIFFERENTIAL; Future    #essential hypertension  -Currently doing well, stable disease  -BP goal <130/80   -Patient education completed on exercise, diet and weight loss   -Continue current blood pressure medications, refills provided  -Continue taking blood pressure twice a day and recording in log  -Bring blood pressure log to each visit  -Continue to reduce salt in diet and increase colorful fruits and vegetables  -Continue to work on goal of 30 minutes physical activity daily  -Recommended to use NSAIDs sparingly  -Call clinic or go to emergency room if chest pain, dizziness, jaw pain, difficulty breathing, vision changes or weakness.    #hyperlipidemia  -declines starting a statin, opts for diet and exercise  -repeat levels in 6 months    Repeat lab work in 6 months    I spent 30 minutes with pre-charting, chart reviewing, face-to-face with the patient with over half of the time spent in counseling and coordination of care including patient education, reviewing diagnostic testing,  prognosis and importance of compliance with treatment options and final documentation of visit.       Proper usage and side effects of medications reviewed and discussed.    Patient education completed on disease process, etiology, and prognosis.  Return to clinic as clinically indicated.  All questions answered and patient verbalized understanding of the conversation and plan.    Return in about 6 months (around 5/17/2022).      Allie Landry MD

## 2021-12-02 ENCOUNTER — APPOINTMENT (OUTPATIENT)
Dept: PHYSICAL THERAPY | Age: 71
End: 2021-12-02
Attending: ORTHOPAEDIC SURGERY
Payer: MEDICARE

## 2021-12-02 DIAGNOSIS — M54.50 CHRONIC LOW BACK PAIN, UNSPECIFIED BACK PAIN LATERALITY, UNSPECIFIED WHETHER SCIATICA PRESENT: ICD-10-CM

## 2021-12-02 DIAGNOSIS — G89.29 CHRONIC LOW BACK PAIN, UNSPECIFIED BACK PAIN LATERALITY, UNSPECIFIED WHETHER SCIATICA PRESENT: ICD-10-CM

## 2021-12-02 RX ORDER — CYCLOBENZAPRINE HCL 10 MG
10 TABLET ORAL 3 TIMES DAILY PRN
Qty: 60 TABLET | Refills: 1 | Status: SHIPPED | OUTPATIENT
Start: 2021-12-02

## 2021-12-02 NOTE — TELEPHONE ENCOUNTER
Pt is requesting a refill on his cyclobenzaprine 10 MG.  He did call the pharmacy but was told to call us  [facility]

## 2021-12-02 NOTE — TELEPHONE ENCOUNTER
This medication has not been issued since Aug 2020  Dr Payton Edmondson 4 times a week. When he golfs, he uses flexeril and ibuprofen and this helps. Does have inflammation in the back. This has helped.   He has pains in his elbows, knees and b

## 2021-12-08 ENCOUNTER — OFFICE VISIT (OUTPATIENT)
Dept: PHYSICAL THERAPY | Age: 71
End: 2021-12-08
Attending: ORTHOPAEDIC SURGERY
Payer: MEDICARE

## 2021-12-08 PROCEDURE — 97110 THERAPEUTIC EXERCISES: CPT

## 2021-12-08 NOTE — PROGRESS NOTES
Dx: Acute thigh pain, right (M79.651)  PT Dx: acute R adductor/quad strain           Insurance (Authorized # of Visits):  Medicare           Authorizing Physician: Dr. Nancy Zamudio MD visit: none scheduled  Fall Risk: standard         Precautions: n/a Date: 10/7/2021             TX#: 5/8  Date: 10/13/2021   Tx#: 6/8 11/4/2021   Tx#: 7/8 12/8/2021   Tx#: 8/9   Warm Up: 5 min on the TM; 2.5 mph  Warm Up: 5 min on the TM; 2.5 mph  Warm Up: 5 min on the TM; 2.5 mph  Warm Up: 5 min on the TM; 2.5 mph  Warm U extension 10 reps x 2 sets   Shuttle double press 4 bands 20 reps;  Shuttle double leg press level 6 10 reps x 2 sets; single leg level 5 10 reps R/L x 2 setes  Double leg heel raises 20 reps  Calf stretch 30 sec hold x 2 sets R/L         There ex:   Reasse Walks - 1 x daily - 7 x weekly - 4 sets - 10 reps  Bridge with Heels on The [place] - 1 x daily - 7 x weekly - 2 sets - 10 reps  Supine Hamstring Curl on Swiss Ball - 1 x daily - 7 x weekly - 2 sets - 10 reps    Charges:  There ex: 3       Total Timed Treatm

## 2022-01-06 ENCOUNTER — OFFICE VISIT (OUTPATIENT)
Dept: PHYSICAL THERAPY | Age: 72
End: 2022-01-06
Attending: SURGERY
Payer: MEDICARE

## 2022-01-06 PROCEDURE — 97110 THERAPEUTIC EXERCISES: CPT

## 2022-04-12 ENCOUNTER — LAB ENCOUNTER (OUTPATIENT)
Dept: LAB | Age: 72
End: 2022-04-12
Attending: UROLOGY
Payer: MEDICARE

## 2022-04-12 DIAGNOSIS — Z85.46 HISTORY OF PROSTATE CANCER: ICD-10-CM

## 2022-04-12 LAB — PSA SERPL-MCNC: 0.35 NG/ML (ref ?–4)

## 2022-04-12 PROCEDURE — 36415 COLL VENOUS BLD VENIPUNCTURE: CPT

## 2022-04-12 PROCEDURE — 84153 ASSAY OF PSA TOTAL: CPT

## 2022-04-18 ENCOUNTER — HOSPITAL ENCOUNTER (EMERGENCY)
Facility: HOSPITAL | Age: 72
Discharge: HOME | End: 2022-04-18
Attending: STUDENT IN AN ORGANIZED HEALTH CARE EDUCATION/TRAINING PROGRAM
Payer: MEDICARE

## 2022-04-18 ENCOUNTER — APPOINTMENT (OUTPATIENT)
Dept: GENERAL RADIOLOGY | Facility: HOSPITAL | Age: 72
End: 2022-04-18
Attending: STUDENT IN AN ORGANIZED HEALTH CARE EDUCATION/TRAINING PROGRAM
Payer: MEDICARE

## 2022-04-18 VITALS
DIASTOLIC BLOOD PRESSURE: 100 MMHG | SYSTOLIC BLOOD PRESSURE: 140 MMHG | WEIGHT: 150 LBS | RESPIRATION RATE: 19 BRPM | TEMPERATURE: 98 F | BODY MASS INDEX: 22 KG/M2 | HEART RATE: 52 BPM | OXYGEN SATURATION: 99 %

## 2022-04-18 DIAGNOSIS — R07.9 CHEST PAIN OF UNCERTAIN ETIOLOGY: Primary | ICD-10-CM

## 2022-04-18 LAB
ALBUMIN SERPL-MCNC: 4 G/DL (ref 3.4–5)
ALBUMIN/GLOB SERPL: 1.3 {RATIO} (ref 1–2)
ALP LIVER SERPL-CCNC: 88 U/L
ALT SERPL-CCNC: 24 U/L
ANION GAP SERPL CALC-SCNC: 3 MMOL/L (ref 0–18)
AST SERPL-CCNC: 28 U/L (ref 15–37)
ATRIAL RATE: 51 BPM
BASOPHILS # BLD AUTO: 0.04 X10(3) UL (ref 0–0.2)
BASOPHILS NFR BLD AUTO: 0.8 %
BILIRUB SERPL-MCNC: 0.5 MG/DL (ref 0.1–2)
BUN BLD-MCNC: 18 MG/DL (ref 7–18)
CALCIUM BLD-MCNC: 9.1 MG/DL (ref 8.5–10.1)
CHLORIDE SERPL-SCNC: 108 MMOL/L (ref 98–112)
CO2 SERPL-SCNC: 30 MMOL/L (ref 21–32)
CREAT BLD-MCNC: 1.14 MG/DL
D DIMER PPP FEU-MCNC: 0.29 UG/ML FEU (ref ?–0.73)
EOSINOPHIL # BLD AUTO: 0.37 X10(3) UL (ref 0–0.7)
EOSINOPHIL NFR BLD AUTO: 7.8 %
ERYTHROCYTE [DISTWIDTH] IN BLOOD BY AUTOMATED COUNT: 11.9 %
GLOBULIN PLAS-MCNC: 3 G/DL (ref 2.8–4.4)
GLUCOSE BLD-MCNC: 95 MG/DL (ref 70–99)
HCT VFR BLD AUTO: 41.7 %
HGB BLD-MCNC: 14.3 G/DL
IMM GRANULOCYTES # BLD AUTO: 0.01 X10(3) UL (ref 0–1)
IMM GRANULOCYTES NFR BLD: 0.2 %
LYMPHOCYTES # BLD AUTO: 1.58 X10(3) UL (ref 1–4)
LYMPHOCYTES NFR BLD AUTO: 33.1 %
MCH RBC QN AUTO: 32.7 PG (ref 26–34)
MCHC RBC AUTO-ENTMCNC: 34.3 G/DL (ref 31–37)
MCV RBC AUTO: 95.4 FL
MONOCYTES # BLD AUTO: 0.44 X10(3) UL (ref 0.1–1)
MONOCYTES NFR BLD AUTO: 9.2 %
NEUTROPHILS # BLD AUTO: 2.33 X10 (3) UL (ref 1.5–7.7)
NEUTROPHILS # BLD AUTO: 2.33 X10(3) UL (ref 1.5–7.7)
NEUTROPHILS NFR BLD AUTO: 48.9 %
OSMOLALITY SERPL CALC.SUM OF ELEC: 294 MOSM/KG (ref 275–295)
P AXIS: 48 DEGREES
P-R INTERVAL: 190 MS
PLATELET # BLD AUTO: 155 10(3)UL (ref 150–450)
POTASSIUM SERPL-SCNC: 3.7 MMOL/L (ref 3.5–5.1)
PROT SERPL-MCNC: 7 G/DL (ref 6.4–8.2)
Q-T INTERVAL: 462 MS
QRS DURATION: 92 MS
QTC CALCULATION (BEZET): 425 MS
R AXIS: 51 DEGREES
RBC # BLD AUTO: 4.37 X10(6)UL
SODIUM SERPL-SCNC: 141 MMOL/L (ref 136–145)
T AXIS: 50 DEGREES
TROPONIN I HIGH SENSITIVITY: 8 NG/L
TROPONIN I HIGH SENSITIVITY: 8 NG/L
VENTRICULAR RATE: 51 BPM
WBC # BLD AUTO: 4.8 X10(3) UL (ref 4–11)

## 2022-04-18 PROCEDURE — 36415 COLL VENOUS BLD VENIPUNCTURE: CPT

## 2022-04-18 PROCEDURE — 71045 X-RAY EXAM CHEST 1 VIEW: CPT | Performed by: STUDENT IN AN ORGANIZED HEALTH CARE EDUCATION/TRAINING PROGRAM

## 2022-04-18 PROCEDURE — 85025 COMPLETE CBC W/AUTO DIFF WBC: CPT | Performed by: STUDENT IN AN ORGANIZED HEALTH CARE EDUCATION/TRAINING PROGRAM

## 2022-04-18 PROCEDURE — 93005 ELECTROCARDIOGRAM TRACING: CPT

## 2022-04-18 PROCEDURE — 99285 EMERGENCY DEPT VISIT HI MDM: CPT

## 2022-04-18 PROCEDURE — 84484 ASSAY OF TROPONIN QUANT: CPT | Performed by: STUDENT IN AN ORGANIZED HEALTH CARE EDUCATION/TRAINING PROGRAM

## 2022-04-18 PROCEDURE — 85379 FIBRIN DEGRADATION QUANT: CPT | Performed by: STUDENT IN AN ORGANIZED HEALTH CARE EDUCATION/TRAINING PROGRAM

## 2022-04-18 PROCEDURE — 80053 COMPREHEN METABOLIC PANEL: CPT | Performed by: STUDENT IN AN ORGANIZED HEALTH CARE EDUCATION/TRAINING PROGRAM

## 2022-04-18 PROCEDURE — 93010 ELECTROCARDIOGRAM REPORT: CPT

## 2022-04-18 RX ORDER — ASPIRIN 81 MG/1
324 TABLET, CHEWABLE ORAL ONCE
Status: COMPLETED | OUTPATIENT
Start: 2022-04-18 | End: 2022-04-18

## 2022-04-18 NOTE — ED INITIAL ASSESSMENT (HPI)
Presents with right sided burning chest pain that woke him up this morning. Started about 40 min PTA. Also reports numbness in his right pinky.

## 2022-04-20 ENCOUNTER — PATIENT OUTREACH (OUTPATIENT)
Dept: CASE MANAGEMENT | Age: 72
End: 2022-04-20

## 2022-04-20 ENCOUNTER — TELEPHONE (OUTPATIENT)
Dept: ORTHOPEDICS CLINIC | Facility: CLINIC | Age: 72
End: 2022-04-20

## 2022-04-20 NOTE — TELEPHONE ENCOUNTER
Patient is coming in for Rt knee pain . Patient had imaging done,Imaging can be viewed in Epic. Please review imaging, and if further imaging is needed please place Rx .   Future Appointments   Date Time Provider Galilea Marisa   6/8/2022  8:20 AM Taya Saleem MD [facility]

## 2022-04-20 NOTE — PROGRESS NOTES
VM received; pt requesting assistance w/scheduling apt (dc 04/18)    Dr Joshua Miranda  [address] [place] [address]  [phone]  Apt made:  Thu 04/28 @11:20am Dr Rashmi Montague  LVM w/apt information; will try later  Confirmed w/pt  Closing encounter

## 2022-05-13 ENCOUNTER — HOSPITAL ENCOUNTER (OUTPATIENT)
Dept: CV DIAGNOSTICS | Age: 72
Discharge: HOME | End: 2022-05-13
Attending: INTERNAL MEDICINE
Payer: MEDICARE

## 2022-05-13 DIAGNOSIS — I10 HTN (HYPERTENSION): ICD-10-CM

## 2022-05-13 DIAGNOSIS — R07.9 CHEST PAIN, UNSPECIFIED: ICD-10-CM

## 2022-05-13 PROCEDURE — 93306 TTE W/DOPPLER COMPLETE: CPT | Performed by: INTERNAL MEDICINE

## 2022-06-08 ENCOUNTER — OFFICE VISIT (OUTPATIENT)
Dept: ORTHOPEDICS CLINIC | Facility: CLINIC | Age: 72
End: 2022-06-08
Payer: MEDICARE

## 2022-06-08 ENCOUNTER — HOSPITAL ENCOUNTER (OUTPATIENT)
Dept: GENERAL RADIOLOGY | Age: 72
Discharge: HOME | End: 2022-06-08
Attending: ORTHOPAEDIC SURGERY
Payer: MEDICARE

## 2022-06-08 ENCOUNTER — OFFICE VISIT (OUTPATIENT)
Dept: FAMILY MEDICINE CLINIC | Facility: CLINIC | Age: 72
End: 2022-06-08
Payer: MEDICARE

## 2022-06-08 VITALS
DIASTOLIC BLOOD PRESSURE: 70 MMHG | HEIGHT: 68 IN | RESPIRATION RATE: 16 BRPM | BODY MASS INDEX: 23.34 KG/M2 | OXYGEN SATURATION: 99 % | WEIGHT: 154 LBS | TEMPERATURE: 98 F | HEART RATE: 72 BPM | SYSTOLIC BLOOD PRESSURE: 118 MMHG

## 2022-06-08 VITALS — HEIGHT: 69 IN | OXYGEN SATURATION: 98 % | WEIGHT: 158.38 LBS | BODY MASS INDEX: 23.46 KG/M2 | HEART RATE: 66 BPM

## 2022-06-08 DIAGNOSIS — M25.561 RIGHT KNEE PAIN, UNSPECIFIED CHRONICITY: ICD-10-CM

## 2022-06-08 DIAGNOSIS — J01.00 ACUTE NON-RECURRENT MAXILLARY SINUSITIS: Primary | ICD-10-CM

## 2022-06-08 DIAGNOSIS — M79.651 ACUTE THIGH PAIN, RIGHT: Primary | ICD-10-CM

## 2022-06-08 PROCEDURE — 99213 OFFICE O/P EST LOW 20 MIN: CPT | Performed by: NURSE PRACTITIONER

## 2022-06-08 PROCEDURE — 73564 X-RAY EXAM KNEE 4 OR MORE: CPT | Performed by: ORTHOPAEDIC SURGERY

## 2022-06-08 PROCEDURE — 99213 OFFICE O/P EST LOW 20 MIN: CPT | Performed by: ORTHOPAEDIC SURGERY

## 2022-06-08 RX ORDER — AMOXICILLIN 875 MG/1
875 TABLET, COATED ORAL 2 TIMES DAILY
Qty: 20 TABLET | Refills: 0 | Status: SHIPPED | OUTPATIENT
Start: 2022-06-08 | End: 2022-06-18

## 2022-06-08 RX ORDER — METHYLPREDNISOLONE 4 MG/1
TABLET ORAL
Qty: 1 EACH | Refills: 0 | Status: SHIPPED | OUTPATIENT
Start: 2022-06-08

## 2022-06-08 NOTE — PROGRESS NOTES
[facility] Progress Note    Subjective: Return patient here for resolved right knee pain. Patient reports that around 6 weeks ago, he had acute worsening of pain around the knee. He does feel that his symptoms started around the time that he was leg pressing 30 pounds. He feels it was not in the joint, but traces along the medial aspect of the knee and anteromedial thigh skilled nursing up. He states that it was worse with direct pressure as well as weightbearing. It was not worse with twisting, no popping or clicking, no locking or injury, no instability to the knee. He states that he does take ibuprofen 400 mg each day, symptoms have resolved at this point he does not have discomfort with the knee. He states that he walks 18 holes of golf 3-4 times a week and it does not bother him. He states that his symptoms did get better following therapy last year. Objective: Patient is awake alert no distress. Right knee demonstrates no effusion. There is no tenderness about the medial joint line, posterior medial patella, medial femoral condyle flexion, MCL. Nontender along the extensor mechanism and VMO. Patient has 5 out of 5 quad strength. Knee is stable to valgus stress, no instability or pain. Imaging: X-rays of the right knee personally viewed, independently interpreted and radiology report read. Demonstrates well-maintained joint space, no degenerative changes about the knee joint. Assessment/Plan: 39-year-old male with recurrent, resolved, right knee/thigh area pain. We discussed potential etiologies, including muscular strain/VMO, quadriceps, or intra-articular/ligamentous injury such as focal cartilage injury or MCL. At this point symptoms have resolved. I did advise the patient my recommendations if it were to recur he can increase his dose and/or frequency of ibuprofen, ice, rest, potentially physical therapy again. He expressed understanding and agreement.     Marcin Barnes MD, [address] [place] [address] Orthopedic Surgery  Phone [phone]  Fax [phone]

## 2022-09-13 ENCOUNTER — TELEPHONE (OUTPATIENT)
Dept: FAMILY MEDICINE CLINIC | Facility: CLINIC | Age: 72
End: 2022-09-13

## 2022-09-13 DIAGNOSIS — Z85.46 HISTORY OF PROSTATE CANCER: Primary | ICD-10-CM

## 2022-09-13 DIAGNOSIS — I10 ESSENTIAL HYPERTENSION: ICD-10-CM

## 2022-09-13 DIAGNOSIS — Z13.6 SCREENING FOR CARDIOVASCULAR CONDITION: ICD-10-CM

## 2022-09-13 NOTE — TELEPHONE ENCOUNTER
Please enter lab orders for the patient's upcoming physical appointment. Physical scheduled: Your appointments     Date & Time Appointment Department [facility])    Oct 31, 2022  2:00 PM CDT Medicare Annual Well Visit with Geena Conley MD [facility], [address], [place]  ([place])            [facility] Dunne [phone]         Preferred lab: [facility])     The patient has been notified to complete fasting labs prior to their physical appointment.

## 2022-10-18 ENCOUNTER — LABORATORY ENCOUNTER (OUTPATIENT)
Dept: LAB | Age: 72
End: 2022-10-18
Attending: FAMILY MEDICINE
Payer: MEDICARE

## 2022-10-18 DIAGNOSIS — Z13.6 SCREENING FOR CARDIOVASCULAR CONDITION: ICD-10-CM

## 2022-10-18 DIAGNOSIS — I10 ESSENTIAL HYPERTENSION: ICD-10-CM

## 2022-10-18 DIAGNOSIS — Z85.46 HISTORY OF PROSTATE CANCER: ICD-10-CM

## 2022-10-18 LAB
ALBUMIN SERPL-MCNC: 3.7 G/DL (ref 3.4–5)
ALBUMIN/GLOB SERPL: 1.3 {RATIO} (ref 1–2)
ALP LIVER SERPL-CCNC: 82 U/L
ALT SERPL-CCNC: 28 U/L
ANION GAP SERPL CALC-SCNC: 3 MMOL/L (ref 0–18)
AST SERPL-CCNC: 17 U/L (ref 15–37)
BILIRUB SERPL-MCNC: 0.5 MG/DL (ref 0.1–2)
BUN BLD-MCNC: 19 MG/DL (ref 7–18)
CALCIUM BLD-MCNC: 8.6 MG/DL (ref 8.5–10.1)
CHLORIDE SERPL-SCNC: 111 MMOL/L (ref 98–112)
CHOLEST SERPL-MCNC: 187 MG/DL (ref ?–200)
CO2 SERPL-SCNC: 28 MMOL/L (ref 21–32)
CREAT BLD-MCNC: 1.1 MG/DL
FASTING PATIENT LIPID ANSWER: YES
FASTING STATUS PATIENT QL REPORTED: YES
GFR SERPLBLD BASED ON 1.73 SQ M-ARVRAT: 70 ML/MIN/1.73M2 (ref 60–?)
GLOBULIN PLAS-MCNC: 2.9 G/DL (ref 2.8–4.4)
GLUCOSE BLD-MCNC: 97 MG/DL (ref 70–99)
HDLC SERPL-MCNC: 57 MG/DL (ref 40–59)
LDLC SERPL CALC-MCNC: 118 MG/DL (ref ?–100)
NONHDLC SERPL-MCNC: 130 MG/DL (ref ?–130)
OSMOLALITY SERPL CALC.SUM OF ELEC: 296 MOSM/KG (ref 275–295)
POTASSIUM SERPL-SCNC: 4.3 MMOL/L (ref 3.5–5.1)
PROT SERPL-MCNC: 6.6 G/DL (ref 6.4–8.2)
PSA SERPL-MCNC: 0.35 NG/ML (ref ?–4)
SODIUM SERPL-SCNC: 142 MMOL/L (ref 136–145)
TRIGL SERPL-MCNC: 66 MG/DL (ref 30–149)
VLDLC SERPL CALC-MCNC: 11 MG/DL (ref 0–30)

## 2022-10-18 PROCEDURE — 80061 LIPID PANEL: CPT

## 2022-10-18 PROCEDURE — 84153 ASSAY OF PSA TOTAL: CPT

## 2022-10-18 PROCEDURE — 36415 COLL VENOUS BLD VENIPUNCTURE: CPT

## 2022-10-18 PROCEDURE — 80053 COMPREHEN METABOLIC PANEL: CPT

## 2022-10-31 ENCOUNTER — OFFICE VISIT (OUTPATIENT)
Dept: FAMILY MEDICINE CLINIC | Facility: CLINIC | Age: 72
End: 2022-10-31
Payer: MEDICARE

## 2022-10-31 VITALS
SYSTOLIC BLOOD PRESSURE: 120 MMHG | HEART RATE: 68 BPM | DIASTOLIC BLOOD PRESSURE: 70 MMHG | BODY MASS INDEX: 23.11 KG/M2 | HEIGHT: 69 IN | OXYGEN SATURATION: 98 % | TEMPERATURE: 99 F | WEIGHT: 156 LBS | RESPIRATION RATE: 16 BRPM

## 2022-10-31 DIAGNOSIS — M25.512 STERNOCLAVICULAR JOINT PAIN, LEFT: ICD-10-CM

## 2022-10-31 DIAGNOSIS — I10 ESSENTIAL HYPERTENSION: ICD-10-CM

## 2022-10-31 DIAGNOSIS — J32.9 CHRONIC SINUSITIS, UNSPECIFIED LOCATION: ICD-10-CM

## 2022-10-31 DIAGNOSIS — Z00.00 ENCOUNTER FOR ANNUAL HEALTH EXAMINATION: Primary | ICD-10-CM

## 2022-10-31 DIAGNOSIS — B02.29 HZV (HERPES ZOSTER VIRUS) POST HERPETIC NEURALGIA: ICD-10-CM

## 2022-10-31 DIAGNOSIS — Z13.31 DEPRESSION SCREENING: ICD-10-CM

## 2022-10-31 PROBLEM — M77.02 MEDIAL EPICONDYLITIS OF LEFT ELBOW: Status: RESOLVED | Noted: 2017-09-01 | Resolved: 2022-10-31

## 2022-10-31 PROBLEM — M46.96 UNSPECIFIED INFLAMMATORY SPONDYLOPATHY, LUMBAR REGION: Status: RESOLVED | Noted: 2020-12-14 | Resolved: 2022-10-31

## 2022-10-31 PROBLEM — M23.91 INTERNAL DERANGEMENT OF KNEE, RIGHT: Status: RESOLVED | Noted: 2019-05-21 | Resolved: 2022-10-31

## 2022-10-31 PROBLEM — M47.816 LUMBAR SPONDYLOSIS: Status: RESOLVED | Noted: 2018-01-18 | Resolved: 2022-10-31

## 2022-10-31 PROBLEM — M46.96 UNSPECIFIED INFLAMMATORY SPONDYLOPATHY, LUMBAR REGION (HCC): Status: RESOLVED | Noted: 2020-12-14 | Resolved: 2022-10-31

## 2022-10-31 PROCEDURE — 1125F AMNT PAIN NOTED PAIN PRSNT: CPT | Performed by: FAMILY MEDICINE

## 2022-10-31 PROCEDURE — G0439 PPPS, SUBSEQ VISIT: HCPCS | Performed by: FAMILY MEDICINE

## 2022-10-31 PROCEDURE — G0444 DEPRESSION SCREEN ANNUAL: HCPCS | Performed by: FAMILY MEDICINE

## 2022-10-31 RX ORDER — AMLODIPINE BESYLATE 5 MG/1
5 TABLET ORAL DAILY
Qty: 90 TABLET | Refills: 3 | Status: SHIPPED | OUTPATIENT
Start: 2022-10-31

## 2022-10-31 RX ORDER — LIDOCAINE 50 MG/G
2 PATCH TOPICAL EVERY 24 HOURS
Qty: 60 PATCH | Refills: 1 | Status: SHIPPED | OUTPATIENT
Start: 2022-10-31

## 2022-10-31 RX ORDER — MONTELUKAST SODIUM 10 MG/1
10 TABLET ORAL DAILY
Qty: 90 TABLET | Refills: 1 | Status: SHIPPED | OUTPATIENT
Start: 2022-10-31

## 2023-01-12 ENCOUNTER — TELEPHONE (OUTPATIENT)
Dept: FAMILY MEDICINE CLINIC | Facility: CLINIC | Age: 73
End: 2023-01-12

## 2023-01-12 NOTE — TELEPHONE ENCOUNTER
Received medical records request from [address] [place] [address] requesting patient's medical records from the last 5 years, 12/30/2017-present. All records located in [address] [place] [address] in which request was sent to [employer].  [address], [address] [place] [address] [phone]  Work order # [id number]

## 2023-02-09 ENCOUNTER — TELEPHONE (OUTPATIENT)
Dept: FAMILY MEDICINE CLINIC | Facility: CLINIC | Age: 73
End: 2023-02-09

## 2023-02-15 ENCOUNTER — TELEPHONE (OUTPATIENT)
Dept: ORTHOPEDICS CLINIC | Facility: CLINIC | Age: 73
End: 2023-02-15

## 2023-02-15 DIAGNOSIS — M25.512 LEFT SHOULDER PAIN, UNSPECIFIED CHRONICITY: Primary | ICD-10-CM

## 2023-02-15 NOTE — TELEPHONE ENCOUNTER
Patient is coming in  for left shoulder pain currently there is no  imagining on file. Please create order for xray and I will notify patient to arrive  20 minutes prior to appointment time for imaging.     Future Appointments   Date Time Provider Galilea Cunningham   2/17/2023  9:00 AM [id number]       Thank you,

## 2023-02-15 NOTE — TELEPHONE ENCOUNTER
XR ordered and scheduled per Ortho protocol.    Sent patient message via [employer] to inform them and ask them to arrive 15-20 minutes prior to appointment with Maite Plate

## 2023-02-17 ENCOUNTER — HOSPITAL ENCOUNTER (OUTPATIENT)
Dept: GENERAL RADIOLOGY | Age: 73
Discharge: HOME | End: 2023-02-17
Attending: ORTHOPAEDIC SURGERY
Payer: MEDICARE

## 2023-02-17 ENCOUNTER — OFFICE VISIT (OUTPATIENT)
Dept: ORTHOPEDICS CLINIC | Facility: CLINIC | Age: 73
End: 2023-02-17
Payer: MEDICARE

## 2023-02-17 DIAGNOSIS — S43.62XA SPRAIN OF LEFT STERNOCLAVICULAR JOINT, INITIAL ENCOUNTER: Primary | ICD-10-CM

## 2023-02-17 DIAGNOSIS — M25.512 LEFT SHOULDER PAIN, UNSPECIFIED CHRONICITY: ICD-10-CM

## 2023-02-17 PROCEDURE — 73000 X-RAY EXAM OF COLLAR BONE: CPT | Performed by: ORTHOPAEDIC SURGERY

## 2023-02-17 RX ORDER — KETOROLAC TROMETHAMINE 30 MG/ML
30 INJECTION, SOLUTION INTRAMUSCULAR; INTRAVENOUS ONCE
Status: COMPLETED | OUTPATIENT
Start: 2023-02-17 | End: 2023-02-17

## 2023-02-17 RX ORDER — TRIAMCINOLONE ACETONIDE 40 MG/ML
40 INJECTION, SUSPENSION INTRA-ARTICULAR; INTRAMUSCULAR ONCE
Status: COMPLETED | OUTPATIENT
Start: 2023-02-17 | End: 2023-02-17

## 2023-02-17 RX ADMIN — TRIAMCINOLONE ACETONIDE 40 MG: 40 INJECTION, SUSPENSION INTRA-ARTICULAR; INTRAMUSCULAR at 10:30:00

## 2023-02-17 RX ADMIN — KETOROLAC TROMETHAMINE 30 MG: 30 INJECTION, SOLUTION INTRAMUSCULAR; INTRAVENOUS at 10:30:00

## 2023-02-18 NOTE — PROCEDURES
Left Shoulder Sternoclavicular joint Injection    Name: Kayleen Malik   MRN: [id number]  Date: 2/17/2023     Clinical Indications:   Persistent Shoulder pain refractory to conservative measures. After informed consent, the injection site was marked, sterilized with topical chlorhexidine antiseptic, and locally anesthetized with skin refrigerant. The patient was seated upright and the shoulder was exposed. Using sterile technique: 1 mL of 30mg/mL of Ketorolac, 2 mL of 0.5% Bupivicaine, 2 mL of 1% Lidocaine, and 1 mg of 40mg/mL of Triamcinolone (Kenalog) was injected with a Anterior approach utilizing a 21 gauge needle. A band-aid was applied. The patient tolerated the procedure well. Disposition:   Return to clinic on an as needed basis. Masha Glover. Elizabeth Baker MD  Knee, Shoulder, & Elbow Surgery / Sports Medicine Specialist  [facility]  [place] [address], [facility], [address] [facility]. [facility]. [email or web address]  t: [phone]  o: [phone]  f: [phone]

## 2023-04-04 ENCOUNTER — APPOINTMENT (OUTPATIENT)
Dept: ULTRASOUND IMAGING | Facility: HOSPITAL | Age: 73
End: 2023-04-04
Attending: EMERGENCY MEDICINE
Payer: MEDICARE

## 2023-04-04 ENCOUNTER — APPOINTMENT (OUTPATIENT)
Dept: GENERAL RADIOLOGY | Facility: HOSPITAL | Age: 73
End: 2023-04-04
Attending: EMERGENCY MEDICINE
Payer: MEDICARE

## 2023-04-04 ENCOUNTER — HOSPITAL ENCOUNTER (EMERGENCY)
Facility: HOSPITAL | Age: 73
Discharge: HOME | End: 2023-04-05
Attending: EMERGENCY MEDICINE
Payer: MEDICARE

## 2023-04-04 VITALS
OXYGEN SATURATION: 98 % | TEMPERATURE: 98 F | SYSTOLIC BLOOD PRESSURE: 137 MMHG | DIASTOLIC BLOOD PRESSURE: 80 MMHG | HEART RATE: 60 BPM | RESPIRATION RATE: 18 BRPM

## 2023-04-04 DIAGNOSIS — L08.9 INFECTED HEMATOMA: Primary | ICD-10-CM

## 2023-04-04 DIAGNOSIS — T14.8XXA INFECTED HEMATOMA: Primary | ICD-10-CM

## 2023-04-04 DIAGNOSIS — S90.32XA CONTUSION OF LEFT FOOT, INITIAL ENCOUNTER: ICD-10-CM

## 2023-04-04 LAB
ALBUMIN SERPL-MCNC: 3.7 G/DL (ref 3.4–5)
ALBUMIN/GLOB SERPL: 1.3 {RATIO} (ref 1–2)
ALP LIVER SERPL-CCNC: 71 U/L
ALT SERPL-CCNC: 23 U/L
ANION GAP SERPL CALC-SCNC: 6 MMOL/L (ref 0–18)
AST SERPL-CCNC: 25 U/L (ref 15–37)
BASOPHILS # BLD AUTO: 0.04 X10(3) UL (ref 0–0.2)
BASOPHILS NFR BLD AUTO: 0.7 %
BILIRUB SERPL-MCNC: 0.4 MG/DL (ref 0.1–2)
BUN BLD-MCNC: 23 MG/DL (ref 7–18)
CALCIUM BLD-MCNC: 8.6 MG/DL (ref 8.5–10.1)
CHLORIDE SERPL-SCNC: 110 MMOL/L (ref 98–112)
CO2 SERPL-SCNC: 29 MMOL/L (ref 21–32)
CREAT BLD-MCNC: 1.08 MG/DL
EOSINOPHIL # BLD AUTO: 0.25 X10(3) UL (ref 0–0.7)
EOSINOPHIL NFR BLD AUTO: 4.6 %
ERYTHROCYTE [DISTWIDTH] IN BLOOD BY AUTOMATED COUNT: 12.4 %
GFR SERPLBLD BASED ON 1.73 SQ M-ARVRAT: 72 ML/MIN/1.73M2 (ref 60–?)
GLOBULIN PLAS-MCNC: 2.8 G/DL (ref 2.8–4.4)
GLUCOSE BLD-MCNC: 103 MG/DL (ref 70–99)
HCT VFR BLD AUTO: 38.5 %
HGB BLD-MCNC: 13.3 G/DL
IMM GRANULOCYTES # BLD AUTO: 0.02 X10(3) UL (ref 0–1)
IMM GRANULOCYTES NFR BLD: 0.4 %
LYMPHOCYTES # BLD AUTO: 1.49 X10(3) UL (ref 1–4)
LYMPHOCYTES NFR BLD AUTO: 27.3 %
MCH RBC QN AUTO: 32.9 PG (ref 26–34)
MCHC RBC AUTO-ENTMCNC: 34.5 G/DL (ref 31–37)
MCV RBC AUTO: 95.3 FL
MONOCYTES # BLD AUTO: 0.46 X10(3) UL (ref 0.1–1)
MONOCYTES NFR BLD AUTO: 8.4 %
NEUTROPHILS # BLD AUTO: 3.19 X10 (3) UL (ref 1.5–7.7)
NEUTROPHILS # BLD AUTO: 3.19 X10(3) UL (ref 1.5–7.7)
NEUTROPHILS NFR BLD AUTO: 58.6 %
OSMOLALITY SERPL CALC.SUM OF ELEC: 304 MOSM/KG (ref 275–295)
PLATELET # BLD AUTO: 168 10(3)UL (ref 150–450)
POTASSIUM SERPL-SCNC: 3.9 MMOL/L (ref 3.5–5.1)
PROT SERPL-MCNC: 6.5 G/DL (ref 6.4–8.2)
RBC # BLD AUTO: 4.04 X10(6)UL
SODIUM SERPL-SCNC: 145 MMOL/L (ref 136–145)
WBC # BLD AUTO: 5.5 X10(3) UL (ref 4–11)

## 2023-04-04 PROCEDURE — 85025 COMPLETE CBC W/AUTO DIFF WBC: CPT | Performed by: EMERGENCY MEDICINE

## 2023-04-04 PROCEDURE — 99284 EMERGENCY DEPT VISIT MOD MDM: CPT

## 2023-04-04 PROCEDURE — 93971 EXTREMITY STUDY: CPT | Performed by: EMERGENCY MEDICINE

## 2023-04-04 PROCEDURE — 99285 EMERGENCY DEPT VISIT HI MDM: CPT

## 2023-04-04 PROCEDURE — 36415 COLL VENOUS BLD VENIPUNCTURE: CPT

## 2023-04-04 PROCEDURE — 80053 COMPREHEN METABOLIC PANEL: CPT | Performed by: EMERGENCY MEDICINE

## 2023-04-04 PROCEDURE — 73630 X-RAY EXAM OF FOOT: CPT | Performed by: EMERGENCY MEDICINE

## 2023-04-04 RX ORDER — CEPHALEXIN 500 MG/1
500 CAPSULE ORAL 3 TIMES DAILY
Qty: 21 CAPSULE | Refills: 0 | Status: SHIPPED | OUTPATIENT
Start: 2023-04-04 | End: 2023-04-11

## 2023-04-04 RX ORDER — CEPHALEXIN 500 MG/1
500 CAPSULE ORAL 3 TIMES DAILY
Qty: 21 CAPSULE | Refills: 0 | Status: SHIPPED | OUTPATIENT
Start: 2023-04-04 | End: 2023-04-04

## 2023-04-05 NOTE — ED QUICK NOTES
Pt resting on cart, resps easy and non-labored. Updated on POC, awaiting US results. Denies any pain or needs at this time.

## 2023-04-05 NOTE — ED INITIAL ASSESSMENT (HPI)
Patient to the ER c/o bruising, swelling and pain to bottom of left foot. Patient reports no known trauma, Today he noticed when he stood up it felt like he was standing on a ball. No swelling is larger and bruise is noted.

## 2023-04-05 NOTE — ED QUICK NOTES
Bruising and redness discoloration noted to the instep and bottom of L foot. Non-tender on palpation. Pt states pain is mild when ambulating. Denies trauma/injury.

## 2023-04-07 ENCOUNTER — TELEPHONE (OUTPATIENT)
Dept: FAMILY MEDICINE CLINIC | Facility: CLINIC | Age: 73
End: 2023-04-07

## 2023-04-07 NOTE — TELEPHONE ENCOUNTER
Called pt and he said that things were better so no need to make a hosp f/u appt   If things get worse he will call

## 2023-04-11 ENCOUNTER — PATIENT MESSAGE (OUTPATIENT)
Dept: FAMILY MEDICINE CLINIC | Facility: CLINIC | Age: 73
End: 2023-04-11

## 2023-04-11 LAB — AMB EXT COVID-19 RESULT: DETECTED

## 2023-04-12 ENCOUNTER — TELEMEDICINE (OUTPATIENT)
Dept: FAMILY MEDICINE CLINIC | Facility: CLINIC | Age: 73
End: 2023-04-12
Payer: MEDICARE

## 2023-04-12 VITALS — DIASTOLIC BLOOD PRESSURE: 65 MMHG | SYSTOLIC BLOOD PRESSURE: 120 MMHG

## 2023-04-12 DIAGNOSIS — U07.1 COVID-19: Primary | ICD-10-CM

## 2023-04-12 PROCEDURE — 99213 OFFICE O/P EST LOW 20 MIN: CPT | Performed by: FAMILY MEDICINE

## 2023-04-12 RX ORDER — NIRMATRELVIR AND RITONAVIR 300-100 MG
KIT ORAL
Qty: 30 TABLET | Refills: 0 | Status: SHIPPED | OUTPATIENT
Start: 2023-04-12

## 2023-04-12 NOTE — PROGRESS NOTES
Patient presents with:  Covid: +     Jonnie Celeste is a 74year old male with PMHwho presents via video encounter. HPI:   Pt is a 64yo male who presents on video visit for recent COVID +test.    Symptoms started Monday evening. Aching, headaches. Nasal congestion. Tuesday as symptoms progressed he did COVID test and it was positive. Cannot breath through nose due to congestion. Using OTC meds to help provide relief. REVIEW OF SYSTEMS:  Pertinent items are noted in HPI. Physical Exam:  alert, appears stated age and cooperative, Speaking in full sentences comfortably and Normal work of breathing    Results for orders placed or performed in visit on 04/12/23   [facility] EXTERNAL COVID-19 INFECTION    Collection Time: 04/11/23 12:00 AM   Result Value Ref Range    COVID19 Detected (A) Not Detected        Assessment and Plan  Diagnoses and all orders for this visit:    COVID-23       newly diagnosed  Given his age, discussed paxlovid, goals, side effects  Will start the med  Continue OTC treatment as well. This visit is conducted using Telemedicine with live, interactive video and audio. [place] time of the encounter.       Kishore Mathur M.D.   [facility]  04/12/23

## 2023-04-12 NOTE — TELEPHONE ENCOUNTER
From: Leopold Ambrosia  To: Lana Nicholas MD  Sent: 4/11/2023 10:43 PM CDT  Subject: Vashti Crouch  It is April 11 at 1000 pm and I just tested positive for Covid. Can I get prescribed that medication Paxil [place] for Covid to take within the first five days? Did two test for verification and both positive. Symptoms started 36 hours ago, noon on Monday. Progressively gotten worse. I am very achy with total nasal blockage, sore throat and 99.6 degree temp.    Aissatou Cota

## 2023-05-09 LAB
AMB EXT BUN: 16 MG/DL
AMB EXT CREATININE: 1.06 MG/DL
AMB EXT EGFR NON-AA: 73.6

## 2023-05-12 ENCOUNTER — TELEPHONE (OUTPATIENT)
Dept: FAMILY MEDICINE CLINIC | Facility: CLINIC | Age: 73
End: 2023-05-12

## 2023-05-12 ENCOUNTER — OFFICE VISIT (OUTPATIENT)
Dept: FAMILY MEDICINE CLINIC | Facility: CLINIC | Age: 73
End: 2023-05-12
Payer: MEDICARE

## 2023-05-12 VITALS
WEIGHT: 155 LBS | BODY MASS INDEX: 22.96 KG/M2 | HEIGHT: 69 IN | OXYGEN SATURATION: 99 % | HEART RATE: 64 BPM | RESPIRATION RATE: 16 BRPM | SYSTOLIC BLOOD PRESSURE: 124 MMHG | DIASTOLIC BLOOD PRESSURE: 64 MMHG

## 2023-05-12 DIAGNOSIS — C44.311 BASAL CELL CARCINOMA (BCC) OF SUPRATIP OF NOSE: ICD-10-CM

## 2023-05-12 DIAGNOSIS — I10 PRIMARY HYPERTENSION: ICD-10-CM

## 2023-05-12 DIAGNOSIS — Z01.818 PREOP EXAMINATION: Primary | ICD-10-CM

## 2023-05-12 DIAGNOSIS — C44.222 SCC (SQUAMOUS CELL CARCINOMA), EAR, RIGHT: ICD-10-CM

## 2023-05-12 PROCEDURE — 99214 OFFICE O/P EST MOD 30 MIN: CPT | Performed by: FAMILY MEDICINE

## 2023-09-26 ENCOUNTER — TELEPHONE (OUTPATIENT)
Dept: FAMILY MEDICINE CLINIC | Facility: CLINIC | Age: 73
End: 2023-09-26

## 2023-09-26 DIAGNOSIS — I10 ESSENTIAL HYPERTENSION: ICD-10-CM

## 2023-09-26 DIAGNOSIS — Z13.6 SCREENING FOR CARDIOVASCULAR CONDITION: ICD-10-CM

## 2023-09-26 DIAGNOSIS — Z85.46 HISTORY OF PROSTATE CANCER: Primary | ICD-10-CM

## 2023-09-26 RX ORDER — AMLODIPINE BESYLATE 5 MG/1
5 TABLET ORAL DAILY
Qty: 90 TABLET | Refills: 0 | Status: SHIPPED | OUTPATIENT
Start: 2023-09-26

## 2023-09-26 NOTE — TELEPHONE ENCOUNTER
Please enter lab orders for the patient's upcoming physical appointment. Physical scheduled: Your appointments       Date & Time Appointment Department [facility])    Oct 24, 2023  3:00 PM CDT Medicare Annual Well Visit with Herman Mays MD [address], [address], [place] ([address])              [address], [address], [address] Felicita Cruz [address] [place] [address] [phone]           Preferred lab: [facility])     The patient has been notified to complete fasting labs prior to their physical appointment.

## 2023-09-26 NOTE — TELEPHONE ENCOUNTER
LOV 5/12/2023      Future Appointments   Date Time Provider Galilea Dunbari   10/24/2023  3:00 PM Jeanie Villalobos MD [facility]      Medication eprescribed to pharmacy as requested. Requested Prescriptions     Signed Prescriptions Disp Refills    amLODIPine 5 MG Oral Tab 90 tablet 0     Sig: Take 1 tablet (5 mg total) by mouth daily.      Authorizing Provider: Ratna Celis     Ordering User: Ash Shape

## 2023-10-17 ENCOUNTER — LAB ENCOUNTER (OUTPATIENT)
Dept: LAB | Age: 73
End: 2023-10-17
Attending: FAMILY MEDICINE
Payer: MEDICARE

## 2023-10-17 DIAGNOSIS — Z85.46 HISTORY OF PROSTATE CANCER: ICD-10-CM

## 2023-10-17 DIAGNOSIS — Z13.6 SCREENING FOR CARDIOVASCULAR CONDITION: ICD-10-CM

## 2023-10-17 LAB
ALBUMIN SERPL-MCNC: 3.8 G/DL (ref 3.4–5)
ALBUMIN/GLOB SERPL: 1.3 {RATIO} (ref 1–2)
ALP LIVER SERPL-CCNC: 96 U/L
ALT SERPL-CCNC: 27 U/L
ANION GAP SERPL CALC-SCNC: 2 MMOL/L (ref 0–18)
AST SERPL-CCNC: 16 U/L (ref 15–37)
BILIRUB SERPL-MCNC: 0.5 MG/DL (ref 0.1–2)
BUN BLD-MCNC: 18 MG/DL (ref 7–18)
CALCIUM BLD-MCNC: 9.7 MG/DL (ref 8.5–10.1)
CHLORIDE SERPL-SCNC: 109 MMOL/L (ref 98–112)
CHOLEST SERPL-MCNC: 177 MG/DL (ref ?–200)
CO2 SERPL-SCNC: 30 MMOL/L (ref 21–32)
CREAT BLD-MCNC: 1.24 MG/DL
EGFRCR SERPLBLD CKD-EPI 2021: 61 ML/MIN/1.73M2 (ref 60–?)
FASTING PATIENT LIPID ANSWER: NO
FASTING STATUS PATIENT QL REPORTED: NO
GLOBULIN PLAS-MCNC: 2.9 G/DL (ref 2.8–4.4)
GLUCOSE BLD-MCNC: 89 MG/DL (ref 70–99)
HDLC SERPL-MCNC: 61 MG/DL (ref 40–59)
LDLC SERPL CALC-MCNC: 101 MG/DL (ref ?–100)
NONHDLC SERPL-MCNC: 116 MG/DL (ref ?–130)
OSMOLALITY SERPL CALC.SUM OF ELEC: 293 MOSM/KG (ref 275–295)
POTASSIUM SERPL-SCNC: 4.2 MMOL/L (ref 3.5–5.1)
PROT SERPL-MCNC: 6.7 G/DL (ref 6.4–8.2)
PSA SERPL-MCNC: 0.28 NG/ML (ref ?–4)
SODIUM SERPL-SCNC: 141 MMOL/L (ref 136–145)
TRIGL SERPL-MCNC: 82 MG/DL (ref 30–149)
VLDLC SERPL CALC-MCNC: 14 MG/DL (ref 0–30)

## 2023-10-17 PROCEDURE — 80061 LIPID PANEL: CPT

## 2023-10-17 PROCEDURE — 80053 COMPREHEN METABOLIC PANEL: CPT

## 2023-10-17 PROCEDURE — 84153 ASSAY OF PSA TOTAL: CPT

## 2023-10-17 PROCEDURE — 36415 COLL VENOUS BLD VENIPUNCTURE: CPT

## 2023-10-24 ENCOUNTER — OFFICE VISIT (OUTPATIENT)
Dept: FAMILY MEDICINE CLINIC | Facility: CLINIC | Age: 73
End: 2023-10-24

## 2023-10-24 VITALS
RESPIRATION RATE: 16 BRPM | SYSTOLIC BLOOD PRESSURE: 130 MMHG | HEART RATE: 64 BPM | OXYGEN SATURATION: 98 % | HEIGHT: 69 IN | BODY MASS INDEX: 23.12 KG/M2 | DIASTOLIC BLOOD PRESSURE: 80 MMHG | WEIGHT: 156.13 LBS

## 2023-10-24 DIAGNOSIS — C49.11: ICD-10-CM

## 2023-10-24 DIAGNOSIS — M54.9 PAIN OF BACK AND RIGHT LOWER EXTREMITY: ICD-10-CM

## 2023-10-24 DIAGNOSIS — Z00.00 ENCOUNTER FOR ANNUAL HEALTH EXAMINATION: Primary | ICD-10-CM

## 2023-10-24 DIAGNOSIS — Z85.46 HISTORY OF PROSTATE CANCER: ICD-10-CM

## 2023-10-24 DIAGNOSIS — R93.7 ABNORMAL CT OF THORACIC SPINE: ICD-10-CM

## 2023-10-24 DIAGNOSIS — R93.7 ABNORMAL CT SCAN, LUMBAR SPINE: ICD-10-CM

## 2023-10-24 DIAGNOSIS — E04.1 THYROID NODULE GREATER THAN OR EQUAL TO 1.5 CM IN DIAMETER INCIDENTALLY NOTED ON IMAGING STUDY: ICD-10-CM

## 2023-10-24 DIAGNOSIS — I10 PRIMARY HYPERTENSION: ICD-10-CM

## 2023-10-24 DIAGNOSIS — D72.829 LEUKOCYTOSIS, UNSPECIFIED TYPE: ICD-10-CM

## 2023-10-24 DIAGNOSIS — M79.604 PAIN OF BACK AND RIGHT LOWER EXTREMITY: ICD-10-CM

## 2023-10-24 DIAGNOSIS — R10.31 RIGHT GROIN PAIN: ICD-10-CM

## 2023-10-24 PROCEDURE — 99215 OFFICE O/P EST HI 40 MIN: CPT | Performed by: FAMILY MEDICINE

## 2023-10-24 PROCEDURE — G0439 PPPS, SUBSEQ VISIT: HCPCS | Performed by: FAMILY MEDICINE

## 2023-11-08 ENCOUNTER — OFFICE VISIT (OUTPATIENT)
Dept: PHYSICAL MEDICINE AND REHAB | Facility: CLINIC | Age: 73
End: 2023-11-08
Payer: MEDICARE

## 2023-11-08 VITALS — BODY MASS INDEX: 23.11 KG/M2 | WEIGHT: 156 LBS | RESPIRATION RATE: 18 BRPM | HEIGHT: 69 IN

## 2023-11-08 DIAGNOSIS — M54.50 MYOFASCIAL LOW BACK PAIN: ICD-10-CM

## 2023-11-08 DIAGNOSIS — M47.816 LUMBAR SPONDYLOSIS: Primary | ICD-10-CM

## 2023-11-08 PROCEDURE — 99204 OFFICE O/P NEW MOD 45 MIN: CPT | Performed by: PHYSICAL MEDICINE & REHABILITATION

## 2023-11-08 RX ORDER — OMEGA-3 FATTY ACIDS/FISH OIL 300-1000MG
1-2 CAPSULE ORAL AS NEEDED
COMMUNITY

## 2023-11-08 NOTE — PROGRESS NOTES
NEW PATIENT VISIT    CHIEF COMPLAINT  Low back pain    HISTORY OF PRESENTING ILLNESS  Renard Sheppard is a 74year old male who presents for evaluation of low back pain. Patient was referred by his primary care physician for right-sided low back pain. Patient states that over the last 5 years he has been experiencing intermittent flares of pain. Denies any trigger or inciting event or injury. He does have some radiation of pain in the right groin and right knee, these may be isolated or connected he is unsure. He states the symptoms wax and wane for the last 4 months, worsening 2 days ago with a flare in his groin pain. Had difficulty rising out of his chair but since then has improved. He had a PET scan a month ago at [facility] as well as an MRI of the lumbar spine scheduled for 11/22/2023 at [facility]. Not currently using any pain medication. Rates his pain 3/10 at present and 5/10 at worst.    His pain currently is in the right buttock and low back, he has right hip and knee pain as well. He was told that his right knee and hip are ok from a bony standpoint. He plays golf. His right buttock pain is rated 2 with a pulling sensation when he stretches the buttock muscles. He is able to reproduce the pain with a lacrosse ball. He has a newer pain in the right anterior thigh from the knee to the lower right abdomen. He states that this pain flared earlier this week causing him not to be able to get out of his chair. This has improved over the last two days. PAST MEDICAL HISTORY  Past Medical History:   Diagnosis Date    Cancer ([address] 4/2015    Essential hypertension        PAST SURGICAL HISTORY  Past Surgical History:   Procedure Laterality Date    LIPOMA REMOVAL  1994    on back.   done in [address] [place] [address]  Current Outpatient Medications on File Prior to Visit   Medication Sig Dispense Refill    Ibuprofen (ADVIL) 200 MG Oral Cap Take 1-2 capsules (200-400 mg total) by mouth as needed. amLODIPine 5 MG Oral Tab Take 1 tablet (5 mg total) by mouth daily. 90 tablet 0    lidocaine 5 % External Patch Place 2 patches onto the skin in the morning. 60 patch 1    montelukast (SINGULAIR) 10 MG Oral Tab Take 1 tablet (10 mg total) by mouth in the morning. 90 tablet 1    cyclobenzaprine 10 MG Oral Tab Take 1 tablet (10 mg total) by mouth 3 (three) times daily as needed for Muscle spasms. 60 tablet 1    cetirizine 10 MG Oral Tab Take 1 tablet (10 mg total) by mouth daily. No current facility-administered medications on file prior to visit. ALLERGIES  Allergies   Allergen Reactions    Avelox [Moxifloxacin] OTHER (SEE COMMENTS)     attacks tendon    Bactrim [Sulfamethoxazole W/Trimethoprim] HIVES, RASH and SWELLING    Ciprofloxacin OTHER (SEE COMMENTS)     Tendon pain    Levaquin [Levofloxacin] OTHER (SEE COMMENTS)     Tendon pain       SOCIAL HISTORY   reports that he has never smoked. He has never used smokeless tobacco. He reports current alcohol use of about 1.0 standard drink of alcohol per week. He reports that he does not use drugs. FAMILY HISTORY  Family History   Problem Relation Age of Onset    Diabetes Brother     Ear Problems Neg     Allergies Neg     Cancer Neg     Bleeding Disorders Neg     Clotting Disorder Neg     Thyroid disease Neg     Hypertension Neg     Asthma Neg     Arthritis Neg     Anesthesia Problems  Neg        REVIEW OF SYSTEMS  Complete review of systems was performed and was negative except for those items stated in the History of Presenting Illness and Past Medical/Surgical History. PHYSICAL EXAMINATION  GENERAL:  In no acute distress. Well-developed and well nourished. SKIN: No rashes or open wounds involving posterior torso, posterior pelvis, lower extremities.   NEUROLOGIC:   Strength: 5/5 bilaterally with hip flexion, knee extension, knee flexion, ankle dorsiflexion, ankle eversion, ankle inversion, ankle plantarflexion, and great toe extension. Sensation: intact light touch sensation throughout both lower extremities. Reflexes: intact and symmetric in bilateral lower extremities. Babinski downgoing bilaterally. No clonus. Gait: able to heel walk, toe walk, and perform tandem gait. MUSCULOSKELETAL:  Inspection: Normal alignment of lumbar spine. No shift or scoliosis. Normal posture. Equal iliac crest heights. No pelvic asymmetry. Palpation: TTP at hip flexor  TTP at adductor  TTP at right L4/5 on the right  TTP at right gluteal muscles. Good strength, and sesnation  Right SLR negative, slump sit negative. REVIEW OF PRIOR X-RAYS/STUDIES  Independently reviewed the whole-body PET scan with the patient dated 7/26/2023 revealed asymmetric uptake in the right L4-5 facet joint consistent with degenerative change. IMPRESSION/DIAGNOSIS  Encounter Diagnoses   Name Primary? Lumbar spondylosis Yes    Myofascial low back pain        TREATMENT/PLAN  MRI as ordered. PT   Consider facet injection/MBB vs TPI of gluteal.     Education was provided regarding the above impression/diagnosis and treatment options/plan were discussed. All questions were answered during today's visit. Patient will contact clinic if any other questions or concerns.         Claudette Zafar DO  Physical Medicine and Rehabilitation / [address] [facility]

## 2023-11-09 ENCOUNTER — HOSPITAL ENCOUNTER (OUTPATIENT)
Dept: ULTRASOUND IMAGING | Age: 73
Discharge: HOME | End: 2023-11-09
Attending: FAMILY MEDICINE
Payer: MEDICARE

## 2023-11-09 ENCOUNTER — LAB ENCOUNTER (OUTPATIENT)
Dept: LAB | Age: 73
End: 2023-11-09
Attending: FAMILY MEDICINE
Payer: MEDICARE

## 2023-11-09 DIAGNOSIS — D72.829 LEUKOCYTOSIS, UNSPECIFIED TYPE: ICD-10-CM

## 2023-11-09 DIAGNOSIS — R93.7 ABNORMAL CT OF THORACIC SPINE: ICD-10-CM

## 2023-11-09 DIAGNOSIS — R93.7 ABNORMAL CT SCAN, LUMBAR SPINE: ICD-10-CM

## 2023-11-09 DIAGNOSIS — E04.1 THYROID NODULE GREATER THAN OR EQUAL TO 1.5 CM IN DIAMETER INCIDENTALLY NOTED ON IMAGING STUDY: ICD-10-CM

## 2023-11-09 LAB
BASOPHILS # BLD AUTO: 0.02 X10(3) UL (ref 0–0.2)
BASOPHILS NFR BLD AUTO: 0.4 %
EOSINOPHIL # BLD AUTO: 0.25 X10(3) UL (ref 0–0.7)
EOSINOPHIL NFR BLD AUTO: 4.5 %
ERYTHROCYTE [DISTWIDTH] IN BLOOD BY AUTOMATED COUNT: 12.4 %
HCT VFR BLD AUTO: 39.1 %
HGB BLD-MCNC: 13.4 G/DL
IMM GRANULOCYTES # BLD AUTO: 0.01 X10(3) UL (ref 0–1)
IMM GRANULOCYTES NFR BLD: 0.2 %
LYMPHOCYTES # BLD AUTO: 0.88 X10(3) UL (ref 1–4)
LYMPHOCYTES NFR BLD AUTO: 15.7 %
MCH RBC QN AUTO: 32.4 PG (ref 26–34)
MCHC RBC AUTO-ENTMCNC: 34.3 G/DL (ref 31–37)
MCV RBC AUTO: 94.4 FL
MONOCYTES # BLD AUTO: 0.45 X10(3) UL (ref 0.1–1)
MONOCYTES NFR BLD AUTO: 8 %
NEUTROPHILS # BLD AUTO: 4 X10 (3) UL (ref 1.5–7.7)
NEUTROPHILS # BLD AUTO: 4 X10(3) UL (ref 1.5–7.7)
NEUTROPHILS NFR BLD AUTO: 71.2 %
PLATELET # BLD AUTO: 161 10(3)UL (ref 150–450)
RBC # BLD AUTO: 4.14 X10(6)UL
WBC # BLD AUTO: 5.6 X10(3) UL (ref 4–11)

## 2023-11-09 PROCEDURE — 85025 COMPLETE CBC W/AUTO DIFF WBC: CPT

## 2023-11-09 PROCEDURE — 84165 PROTEIN E-PHORESIS SERUM: CPT

## 2023-11-09 PROCEDURE — 86334 IMMUNOFIX E-PHORESIS SERUM: CPT

## 2023-11-09 PROCEDURE — 83521 IG LIGHT CHAINS FREE EACH: CPT

## 2023-11-09 PROCEDURE — 36415 COLL VENOUS BLD VENIPUNCTURE: CPT

## 2023-11-09 PROCEDURE — 76536 US EXAM OF HEAD AND NECK: CPT | Performed by: FAMILY MEDICINE

## 2023-11-15 LAB
ALBUMIN SERPL ELPH-MCNC: 4.03 G/DL (ref 3.75–5.21)
ALBUMIN/GLOB SERPL: 1.95 {RATIO} (ref 1–2)
ALPHA1 GLOB SERPL ELPH-MCNC: 0.22 G/DL (ref 0.19–0.46)
ALPHA2 GLOB SERPL ELPH-MCNC: 0.6 G/DL (ref 0.48–1.05)
B-GLOBULIN SERPL ELPH-MCNC: 0.62 G/DL (ref 0.68–1.23)
GAMMA GLOB SERPL ELPH-MCNC: 0.63 G/DL (ref 0.62–1.7)
KAPPA LC FREE SER-MCNC: 1.7 MG/DL (ref 0.33–1.94)
KAPPA LC FREE/LAMBDA FREE SER NEPH: 1.84 {RATIO} (ref 0.26–1.65)
LAMBDA LC FREE SERPL-MCNC: 0.92 MG/DL (ref 0.57–2.63)
PROT SERPL-MCNC: 6.1 G/DL (ref 5.7–8.2)

## 2023-11-22 ENCOUNTER — HOSPITAL ENCOUNTER (OUTPATIENT)
Dept: MRI IMAGING | Age: 73
Discharge: HOME | End: 2023-11-22
Attending: FAMILY MEDICINE
Payer: MEDICARE

## 2023-11-22 DIAGNOSIS — R93.7 ABNORMAL CT SCAN, LUMBAR SPINE: ICD-10-CM

## 2023-11-22 DIAGNOSIS — R93.7 ABNORMAL CT OF THORACIC SPINE: ICD-10-CM

## 2023-11-22 PROCEDURE — 72157 MRI CHEST SPINE W/O & W/DYE: CPT | Performed by: FAMILY MEDICINE

## 2023-11-22 PROCEDURE — 72158 MRI LUMBAR SPINE W/O & W/DYE: CPT | Performed by: FAMILY MEDICINE

## 2023-11-22 PROCEDURE — A9575 INJ GADOTERATE MEGLUMI 0.1ML: HCPCS | Performed by: FAMILY MEDICINE

## 2023-11-22 RX ORDER — GADOTERATE MEGLUMINE 376.9 MG/ML
15 INJECTION INTRAVENOUS
Status: COMPLETED | OUTPATIENT
Start: 2023-11-22 | End: 2023-11-22

## 2023-11-22 RX ADMIN — GADOTERATE MEGLUMINE 15 ML: 376.9 INJECTION INTRAVENOUS at 11:16:00

## 2023-12-07 DIAGNOSIS — I10 ESSENTIAL HYPERTENSION: ICD-10-CM

## 2023-12-09 RX ORDER — AMLODIPINE BESYLATE 5 MG/1
5 TABLET ORAL DAILY
Qty: 90 TABLET | Refills: 1 | Status: SHIPPED | OUTPATIENT
Start: 2023-12-09

## 2023-12-11 ENCOUNTER — MED REC SCAN ONLY (OUTPATIENT)
Dept: PHYSICAL MEDICINE AND REHAB | Facility: CLINIC | Age: 73
End: 2023-12-11

## 2023-12-13 ENCOUNTER — OFFICE VISIT (OUTPATIENT)
Dept: PHYSICAL MEDICINE AND REHAB | Facility: CLINIC | Age: 73
End: 2023-12-13
Payer: MEDICARE

## 2023-12-13 VITALS — BODY MASS INDEX: 23.11 KG/M2 | WEIGHT: 156 LBS | HEIGHT: 69 IN

## 2023-12-13 DIAGNOSIS — S76.211A STRAIN OF ADDUCTOR MAGNUS MUSCLE OF RIGHT LOWER EXTREMITY, INITIAL ENCOUNTER: ICD-10-CM

## 2023-12-13 DIAGNOSIS — M54.50 MYOFASCIAL LOW BACK PAIN: ICD-10-CM

## 2023-12-13 DIAGNOSIS — M47.816 LUMBAR SPONDYLOSIS: Primary | ICD-10-CM

## 2023-12-13 PROCEDURE — 99214 OFFICE O/P EST MOD 30 MIN: CPT | Performed by: PHYSICAL MEDICINE & REHABILITATION

## 2023-12-13 NOTE — PROGRESS NOTES
RETURN PATIENT VISIT    CHIEF COMPLAINT  Low back pain    INTERVAL HISTORY  Roy Eisenmenger is a 74year old who was last seen in clinic on 11/8/2023, patient returns today following up on physical therapy as well as the results of his MRI which were reviewed in full below. He continues to have a minor right-sided low back aching pain with right medial thigh and groin burning pain. He denies numbness and tingling traveling down the leg crossing the knee. He denies weakness. Currently rates pain 2/10 and is not using any medication at this time. He has been enjoying PT, at times feeling like he may overdo it. He is feeling much better after PT. He feels the pain in the facet on the right low back has improved. He endorses right medial thigh pain and right groin pain has persisted. This is overall maybe 75% reduced from initially. He has pain with full adduction in the groin on the right side. He has been able to walk an hour recently which is an improvement. He has 1 more therapy session. REVIEW OF SYSTEMS  Review of systems was completed with the patient today as pertinent to today's visit    PHYSICAL EXAMINATION  CONSTITUTIONAL: Well-appearing, in no apparent distress  EYES: No scleral icterus or conjunctival hemorrhage  CARDIOVASCULAR: Skin warm and well-perfused, no peripheral edema  RESPIRATORY: Breathing unlabored without accessory muscle use  PSYCHIATRIC: Alert, cooperative, appropriate mood and affect  SKIN: No lesions or rashes on exposed skin  MUSCULOSKELETAL: Normal alignment of lumbar spine. No shift or scoliosis. Normal posture. Equal iliac crest heights. No pelvic asymmetry. Palpation: TTP at hip flexor  TTP at adductor attachment the pubic symphysis on the right  Which is improved TTP at right L4/5 on the right  Improved TTP at right gluteal muscles. Good strength, and sesnation  Right SLR negative, slump sit negative.      REVIEW OF PRIOR X-RAYS/STUDIES  Independently reviewed the MRI of the lumbar spine, MRI shows moderate facet arthritic change, involving both the L4/5 and L5/S1 facet joints in the low back. whole-body PET scan with the patient dated 7/26/2023 revealed asymmetric uptake in the right L4-5 facet joint consistent with degenerative change. IMPRESSION/DIAGNOSIS  1. Encounter Diagnoses   Name Primary? Lumbar spondylosis Yes    Myofascial low back pain     Strain of adductor katelynn muscle of right lower extremity, initial encounter          TREATMENT/PLAN  Patient progressing very well in physical therapy and with home exercises in regards to his facetogenic low back pain gluteal pain and adductor pain. His adductor pain at the insertion of the pubic symphysis remains his biggest complaint at this time. Overall about 75% improved. We discussed activity modification, ongoing home exercises. He will follow-up with his orthopedic surgeon for his right knee pain, consideration here may be local diagnostic trigger point junction to the distal adductor to determine his pain generator however I will defer to his orthopedic doctor for ongoing management of this. He will follow-up with me in the future as needed. Education was provided regarding the above impression/diagnosis and treatment options/plan were discussed. All questions were answered during today's visit. Patient will contact clinic if any other questions or concerns.     Olya Knowles DO  Physical Medicine and Rehabilitation / [address]  [facility]

## 2023-12-18 ENCOUNTER — HOSPITAL ENCOUNTER (OUTPATIENT)
Dept: GENERAL RADIOLOGY | Age: 73
Discharge: HOME | End: 2023-12-18
Attending: ORTHOPAEDIC SURGERY
Payer: MEDICARE

## 2023-12-18 ENCOUNTER — OFFICE VISIT (OUTPATIENT)
Dept: ORTHOPEDICS CLINIC | Facility: CLINIC | Age: 73
End: 2023-12-18
Payer: MEDICARE

## 2023-12-18 VITALS — HEIGHT: 69 IN | BODY MASS INDEX: 23.37 KG/M2 | WEIGHT: 157.81 LBS

## 2023-12-18 DIAGNOSIS — M25.561 RIGHT KNEE PAIN, UNSPECIFIED CHRONICITY: ICD-10-CM

## 2023-12-18 DIAGNOSIS — M25.561 RIGHT KNEE PAIN, UNSPECIFIED CHRONICITY: Primary | ICD-10-CM

## 2023-12-18 PROCEDURE — 73564 X-RAY EXAM KNEE 4 OR MORE: CPT | Performed by: ORTHOPAEDIC SURGERY

## 2023-12-18 PROCEDURE — 99213 OFFICE O/P EST LOW 20 MIN: CPT | Performed by: ORTHOPAEDIC SURGERY

## 2023-12-18 NOTE — PROGRESS NOTES
[facility] Progress Note    Subjective: Patient returns to clinic for his right knee. He has been following with physiatry recently for low back pain. He had also been working on thigh pain that was felt to be a adductor muscle pain. He has done therapy and notes that symptoms have largely gotten better, but he does continue to have a persistent pain near the knee, points superomedial above the knee. He states it is focal, hurts when he presses the area. He does state that this pain has been present for more than a few years. Denies any injury, popping or clicking. He is looking to get back to activities including golfing. 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 Objective: Patient appears fit, active. Right knee without effusion. There is no appreciable effusion in the suprapatellar pouch. No tenderness about the medial or lateral joint line. He has full extension and flexion past 120, knee is stable to varus and valgus stress, Lachman's and posterior drawer. Salina's is negative. Area of patient's focal pain is superomedial, slightly anteriorly, over the distal medial femur proximal to the medial epicondyle, near the superomedial pouch area. He does not have pain with resisted hip adduction. There are no palpable masses or lesions in this area, palpation is largely over the synovium. Imaging: X-rays of the right knee personally viewed, independently interpreted and radiology report read. Demonstrates mild osteoarthritis with possible lateral joint space loss, largely maintained medial joint space, no osteophyte formation. Lateral view also with possible lateral joint space narrowing. Assessment/Plan: 75-year-old male with focal right medial knee area pain. Discussed potential etiologies.   Concern was previously for adductor insertion strain, possible focal synovitis. He had been discussing trigger point injection to this area. We did discuss potential for advanced imaging such as MRI. I also discussed potential evaluation by my partner who may recommend advanced imaging such as MRI or ultrasound, or for discussion as to whether trigger point injection may be warranted, or another treatment that he may recommend. Patient expressed understanding and agreement with this. He was set up for an appointment and will follow-up with Dr. Ramandeep Joseph.     Alesia Combs MD, [address] [place] [address] Orthopedic Surgery  Phone [phone]  Fax [phone]

## 2023-12-22 ENCOUNTER — OFFICE VISIT (OUTPATIENT)
Dept: FAMILY MEDICINE CLINIC | Facility: CLINIC | Age: 73
End: 2023-12-22
Payer: MEDICARE

## 2023-12-22 VITALS
RESPIRATION RATE: 18 BRPM | SYSTOLIC BLOOD PRESSURE: 148 MMHG | DIASTOLIC BLOOD PRESSURE: 76 MMHG | TEMPERATURE: 98 F | OXYGEN SATURATION: 98 %

## 2023-12-22 DIAGNOSIS — J01.00 ACUTE NON-RECURRENT MAXILLARY SINUSITIS: Primary | ICD-10-CM

## 2023-12-22 PROCEDURE — 99213 OFFICE O/P EST LOW 20 MIN: CPT | Performed by: NURSE PRACTITIONER

## 2023-12-22 RX ORDER — AMOXICILLIN AND CLAVULANATE POTASSIUM 875; 125 MG/1; MG/1
1 TABLET, FILM COATED ORAL 2 TIMES DAILY
Qty: 20 TABLET | Refills: 0 | Status: SHIPPED | OUTPATIENT
Start: 2023-12-22 | End: 2024-01-01

## 2024-01-27 NOTE — DISCHARGE INSTRUCTIONS
Use the antibiotics as prescribed. Ice your foot 4 times per day for 15 minutes at a time. Elevate is much as you can. Return if develop any worrisome symptoms.   Otherwise follow-up with your primary care physician as an outpatient
kailee

## 2024-02-29 ENCOUNTER — TELEPHONE (OUTPATIENT)
Dept: PHYSICAL MEDICINE AND REHAB | Facility: CLINIC | Age: 74
End: 2024-02-29

## 2024-02-29 ENCOUNTER — OFFICE VISIT (OUTPATIENT)
Dept: PHYSICAL MEDICINE AND REHAB | Facility: CLINIC | Age: 74
End: 2024-02-29
Payer: MEDICARE

## 2024-02-29 VITALS — WEIGHT: 155 LBS | BODY MASS INDEX: 23 KG/M2 | HEART RATE: 62 BPM | OXYGEN SATURATION: 99 %

## 2024-02-29 DIAGNOSIS — M47.816 LUMBAR SPONDYLOSIS: ICD-10-CM

## 2024-02-29 DIAGNOSIS — M76.891 ADDUCTOR TENDINITIS OF RIGHT HIP: Primary | ICD-10-CM

## 2024-02-29 PROCEDURE — 99214 OFFICE O/P EST MOD 30 MIN: CPT | Performed by: PHYSICAL MEDICINE & REHABILITATION

## 2024-02-29 NOTE — TELEPHONE ENCOUNTER
Per CMS Guidelines -no authorization is required for Right adductor trigger point injection, ultrasound guidance CPT 21141, (), [address]     Status: Authorization is not required based on medical necessity however may be subject to review once claim is submitted-Covered Benefit

## 2024-02-29 NOTE — PROGRESS NOTES
RETURN PATIENT VISIT    CHIEF COMPLAINT  Pain in the low back   Medial thigh    INTERVAL HISTORY  Luigi Walden is a 73 year old who was last seen in clinic on 12/13/24. States that his facet low back pain is bothering him more frequently. States that every step he feel he has pain in the low back on the right side. He is limited by this. He has been diligent with his home exercises. He is having difficulty with his golf swing by his medial right thigh.     Walking is limited by his low back mostly.  Golf swing is limited mostly by his medial right thigh pain.    Continues to deny radiation distally.  Most of his pain is located to the adductors in the right leg.    REVIEW OF SYSTEMS  Review of systems was completed with the patient today as pertinent to today's visit    PHYSICAL EXAMINATION  CONSTITUTIONAL: Well-appearing, in no apparent distress  EYES: No scleral icterus or conjunctival hemorrhage  CARDIOVASCULAR: Skin warm and well-perfused, no peripheral edema  RESPIRATORY: Breathing unlabored without accessory muscle use  PSYCHIATRIC: Alert, cooperative, appropriate mood and affect  SKIN: No lesions or rashes on exposed skin  MUSCULOSKELETAL: Facet loading + on the right at the level of L4/5 .  Significant tenderness to palpation through the adductor attachment as well as origin through the muscle belly of the right side with pain during resisted abduction.  NEUROLOGIC: Sensation and strength intact distally in the lower extremities.    REVIEW OF PRIOR X-RAYS/STUDIES  Interval radiographs of the right knee have been obtained, dated 12/18/2023 which reveal no significant degenerative change or bony abnormality, no joint effusion or evidence of degeneration of the joint space.    IMPRESSION/DIAGNOSIS  1.    Encounter Diagnoses   Name Primary?    Adductor tendinitis of right hip Yes    Lumbar spondylosis          TREATMENT/PLAN  Will plan for adductor TPI with steroid and US  Consider right L4/5 facet  injection.   PT to start.     Education was provided regarding the above impression/diagnosis and treatment options/plan were discussed.  All questions were answered during today's visit.  Patient will contact clinic if any other questions or concerns.          Gary [place] DO  Interventional Spine and Sports Medicine Specialist   Physical Medicine and Rehabilitation  [address] [address]    [facility]  [address] [place] [address]

## 2024-03-07 ENCOUNTER — OFFICE VISIT (OUTPATIENT)
Dept: PHYSICAL MEDICINE AND REHAB | Facility: CLINIC | Age: 74
End: 2024-03-07
Payer: MEDICARE

## 2024-03-07 DIAGNOSIS — M54.50 MYOFASCIAL LOW BACK PAIN: ICD-10-CM

## 2024-03-07 DIAGNOSIS — M76.891 ADDUCTOR TENDINITIS OF RIGHT HIP: Primary | ICD-10-CM

## 2024-03-07 NOTE — PROCEDURES
PROCEDURE NOTE    DIAGNOSIS  1.  Right adductor tendinitis, gracilis pain, pain and increased tone   2.  Myofascial pain    PROCEDURE  Trigger point injection of the right abductor katelynn and right gracilis with ultrasound guidance    PROCEDURE NOTE  Informed consent was obtained. The area to be injected (right medial thigh) was cleaned and prepped with Betadine x 3 and alcohol swab.  Ultrasound guidance was used to identify and avoid crucial neurovascular structures.  Then, using a no touch, sterile technique, a 25 gauge 2.5 inch needle was inserted into the area targeting the right abductor katelynn and gracilis.  For insertion sites were injected. A total of 4 cc of 1% lidocaine and 4 cc of 0.25% bupivicaine with 1 cc of 40 mg/cc Kenalog divided was injected after negative aspiration. The patient tolerated the procedure well without any complications. The patient was given post injection instructions.        Gary [place] DO  Interventional Spine and Sports Medicine Specialist   Physical Medicine and Rehabilitation  [address] [address]    [address] [place] [address]

## 2024-03-11 RX ORDER — LIDOCAINE HYDROCHLORIDE 10 MG/ML
4 INJECTION, SOLUTION INFILTRATION; PERINEURAL ONCE
Status: COMPLETED | OUTPATIENT
Start: 2024-03-11 | End: 2024-03-11

## 2024-03-11 RX ORDER — BUPIVACAINE HYDROCHLORIDE 2.5 MG/ML
4 INJECTION, SOLUTION EPIDURAL; INFILTRATION; INTRACAUDAL ONCE
Status: COMPLETED | OUTPATIENT
Start: 2024-03-11 | End: 2024-03-11

## 2024-03-11 RX ORDER — TRIAMCINOLONE ACETONIDE 40 MG/ML
40 INJECTION, SUSPENSION INTRA-ARTICULAR; INTRAMUSCULAR ONCE
Status: COMPLETED | OUTPATIENT
Start: 2024-03-11 | End: 2024-03-11

## 2024-03-12 ENCOUNTER — MED REC SCAN ONLY (OUTPATIENT)
Dept: PHYSICAL MEDICINE AND REHAB | Facility: CLINIC | Age: 74
End: 2024-03-12

## 2024-04-18 ENCOUNTER — MED REC SCAN ONLY (OUTPATIENT)
Dept: PHYSICAL MEDICINE AND REHAB | Facility: CLINIC | Age: 74
End: 2024-04-18

## 2024-04-24 ENCOUNTER — OFFICE VISIT (OUTPATIENT)
Dept: PHYSICAL MEDICINE AND REHAB | Facility: CLINIC | Age: 74
End: 2024-04-24
Payer: MEDICARE

## 2024-04-24 ENCOUNTER — TELEPHONE (OUTPATIENT)
Dept: PHYSICAL MEDICINE AND REHAB | Facility: CLINIC | Age: 74
End: 2024-04-24

## 2024-04-24 VITALS
HEART RATE: 71 BPM | BODY MASS INDEX: 22.96 KG/M2 | RESPIRATION RATE: 18 BRPM | HEIGHT: 69 IN | OXYGEN SATURATION: 99 % | WEIGHT: 155 LBS

## 2024-04-24 DIAGNOSIS — M47.816 LUMBAR SPONDYLOSIS: Primary | ICD-10-CM

## 2024-04-24 PROCEDURE — 99214 OFFICE O/P EST MOD 30 MIN: CPT | Performed by: PHYSICAL MEDICINE & REHABILITATION

## 2024-04-24 NOTE — PROGRESS NOTES
RETURN PATIENT VISIT    CHIEF COMPLAINT  Axial low back pain    INTERVAL HISTORY  Luigi Walden is a 73 year old who was last seen in clinic on 3/7/2024, at that visit patient underwent trigger point injections to the lumbar paraspinal musculature.  Overall he endorses significant improvement after the trigger point injections.  He is continue with physical therapy as well as at home exercises.  He continues to have some isolated axial low back pain on the right side in the lower part of the lumbar spine.    This has been most consistent during his visits with facetogenic pain, we do have evidence of increased uptake at the L4-5 facet on the right side.  His pain remains extension biased and entirely axial without significant radiation of symptoms into the lower extremities.    His pain is 0/10 at rest but 8/10 when bending backwards on the right side.  He is interested today in discussion of intervention for his facet pain.      REVIEW OF SYSTEMS  Review of systems was completed with the patient today as pertinent to today's visit    PHYSICAL EXAMINATION  CONSTITUTIONAL: Well-appearing, in no apparent distress  EYES: No scleral icterus or conjunctival hemorrhage  CARDIOVASCULAR: Skin warm and well-perfused, no peripheral edema  RESPIRATORY: Breathing unlabored without accessory muscle use  PSYCHIATRIC: Alert, cooperative, appropriate mood and affect  SKIN: No lesions or rashes on exposed skin  MUSCULOSKELETAL: Normal alignment of lumbar spine. No shift or scoliosis. Normal posture. Equal iliac crest heights. No pelvic asymmetry.   Palpation: TTP at hip flexor  TTP at adductor attachment the pubic symphysis on the right  Which is improved TTP at right L4/5 on the right  Improved TTP at right gluteal muscles.      Good strength, and sesnation  Right SLR negative, slump sit negative.     REVIEW OF PRIOR X-RAYS/STUDIES  MRI of the lumbar spine, MRI shows moderate facet arthritic change, involving both the  L4/5 and L5/S1 facet joints in the low back.      whole-body PET scan with the patient dated 7/26/2023 revealed asymmetric uptake in the right L4-5 facet joint consistent with degenerative change.     IMPRESSION/DIAGNOSIS  1.    Encounter Diagnosis   Name Primary?    Lumbar spondylosis Yes         TREATMENT/PLAN  Will address the right-sided L4-5 facet with an injection under fluoroscopic guidance and local anesthesia.  We discussed risk benefits and alternatives of this procedure and he would like to proceed.    We will follow-up for the above injection and then 2 weeks after.    Education was provided regarding the above impression/diagnosis and treatment options/plan were discussed.  All questions were answered during today's visit.  Patient will contact clinic if any other questions or concerns.          Gary [place] DO  Interventional Spine and Sports Medicine Specialist   Physical Medicine and Rehabilitation  [address] [address]    [address] [place] [address]

## 2024-04-24 NOTE — TELEPHONE ENCOUNTER
Patient has been scheduled for Right L4/5 facet injection on 5/13/24 at the [facility] with Dr. Ryan.   -Anesthesia type:  Local  -Patient was advised that if he/she does receive the covid vaccine it needs to be at least 2 weeks before or after the injection.  -Medications and allergies reviewed.  -Patient reminded to hold NSAIDs (Ibuprofen, ASA 81, Aleve, Naproxen, Mobic, Diclofenac, Etodolac, Celebrex etc.) for 3 days prior to Lumbar MBB/Facet if BMI is greater than 35. For Cervical injections only hold multivitamins, Vitamin E, Fish Oil, Phentermine/Lomaira for 7 days prior to injection and NSAIDS.   mg to be held for 7 days prior to injections.  -If patient is receiving MAC/IVCS, weight loss oral/injectable medications will need to be held for 7 days prior to injection.  -Patient informed to fast 12 hours prior to procedure with IVCS/MAC.   -If on blood thinner, clearance has been received and approved to hold this medication by provider.   -Patient informed of [facility]'s  policy:  he/she will need a  to and from procedure and must be on site for their entirety of their visit, if their ride is unable to the procedure will be cancelled.   -[facility] is located in the [facility] 1st floor,  may park in the yellow/purple parking lot.  Patient verbalized understanding and agrees with plan.  Scheduled in Epic: Yes  Scheduled in Surgical Case: Yes  Follow up appointment made: NOV: Visit date not found - MBB Diary

## 2024-04-24 NOTE — TELEPHONE ENCOUNTER
Per CMS Guidelines -no authorization is required for Right L4/5 facet injection, with fluoroscopic guidance CPT 36594 when being performed at an [facility]    Status: Authorization is not required based on medical necessity when being performed at an [facility] however is not a guarantee of payment and may be subject to review once claim is submitted-Covered Benefit     If this procedure is being performed at Outpatient Hospital/[facility]-Authorization IS required, please notify this writer

## 2024-05-01 ENCOUNTER — MED REC SCAN ONLY (OUTPATIENT)
Dept: PHYSICAL MEDICINE AND REHAB | Facility: CLINIC | Age: 74
End: 2024-05-01

## 2024-05-29 ENCOUNTER — OFFICE VISIT (OUTPATIENT)
Dept: PHYSICAL MEDICINE AND REHAB | Facility: CLINIC | Age: 74
End: 2024-05-29

## 2024-05-29 VITALS — BODY MASS INDEX: 23.11 KG/M2 | HEIGHT: 69 IN | WEIGHT: 156 LBS

## 2024-05-29 DIAGNOSIS — M76.891 ADDUCTOR TENDINITIS OF RIGHT HIP: ICD-10-CM

## 2024-05-29 DIAGNOSIS — M47.816 LUMBAR SPONDYLOSIS: ICD-10-CM

## 2024-05-29 DIAGNOSIS — M79.651 PAIN OF RIGHT THIGH: Primary | ICD-10-CM

## 2024-05-29 PROCEDURE — 99214 OFFICE O/P EST MOD 30 MIN: CPT | Performed by: PHYSICAL MEDICINE & REHABILITATION

## 2024-05-29 NOTE — PROGRESS NOTES
RETURN PATIENT VISIT    CHIEF COMPLAINT  Axial low back pain    INTERVAL HISTORY  Luigi Walden is a 74 year old who was last seen in clinic on 5/13/2024 following a right-sided L4-5 facet joint injection.  Overall patient endorses 98% improvement stating that he longer feels pain in the right low back at the facet joint.  He does have some mild discomfort in the left low back in the same area which is not limiting for more severe.    He continues to experience right medial thigh stabbing pain which has been refractory to 5 months of physical therapy.  He has participated in extensive myofascial treatments to the right adductors, right quadriceps musculature.  He states his pain is 0/10 while sitting but can reach 9/10 when applying pressure to the area or while walking.  He pinpoints newer pain at the distal vastus medialis on the right side.  Currently uses Tylenol as needed.    Additionally endorses newer cramping in the right leg in the area of the right anterolateral shin which has been present over the last 2 months.  Denies any red flags.    REVIEW OF SYSTEMS  Review of systems was completed with the patient today as pertinent to today's visit    PHYSICAL EXAMINATION  CONSTITUTIONAL: Well-appearing, in no apparent distress  EYES: No scleral icterus or conjunctival hemorrhage  CARDIOVASCULAR: Skin warm and well-perfused, no peripheral edema  RESPIRATORY: Breathing unlabored without accessory muscle use  PSYCHIATRIC: Alert, cooperative, appropriate mood and affect  SKIN: No lesions or rashes on exposed skin  MUSCULOSKELETAL: He has tenderness to palpation over the vastus medialis on the right side at its distal attachment and slightly proximal to the muscle belly.  Pain with activation of the vastus medialis.  He has tenderness to palpation over the abductor katelynn on the right side as well.  Slump sit is negative.  NEUROLOGIC: Strength is well-maintained in the bilateral lower extremities.  Facet  loading is negative today.    REVIEW OF PRIOR X-RAYS/STUDIES  Previously obtained x-ray of the right knee dated 12/18/2023 independently reviewed which reveals joint spaces well-maintained, no osseous abnormality.    IMPRESSION/DIAGNOSIS  1.    Encounter Diagnoses   Name Primary?    Pain of right thigh Yes    Lumbar spondylosis     Adductor tendinitis of right hip          TREATMENT/PLAN  Consider left facet versus bilateral facet depending on his course over time.  At this time however his back is doing very well.    In regards to his right leg pain and newer cramping to elucidate these symptoms I ordered an EMG of the right leg as well as MRI of the right thigh with muscular atrophy or evidence of tendinosis/tendinitis in the vastus medialis.    We will follow-up for the above EMG as well as after the MRI.    Education was provided regarding the above impression/diagnosis and treatment options/plan were discussed.  All questions were answered during today's visit.  Patient will contact clinic if any other questions or concerns.          Gary Ryan DO  Interventional Spine and Sports Medicine Specialist   Physical Medicine and Rehabilitation  [address] [address]    [address] [place] [address]

## 2024-05-30 ENCOUNTER — TELEPHONE (OUTPATIENT)
Dept: FAMILY MEDICINE CLINIC | Facility: CLINIC | Age: 74
End: 2024-05-30

## 2024-05-30 DIAGNOSIS — Z13.6 SCREENING FOR CARDIOVASCULAR CONDITION: ICD-10-CM

## 2024-05-30 DIAGNOSIS — Z85.46 HISTORY OF PROSTATE CANCER: Primary | ICD-10-CM

## 2024-05-30 NOTE — TELEPHONE ENCOUNTER
Please enter lab orders for the patient's upcoming physical appointment.     Physical scheduled:   Your appointments       Date & Time Appointment Department ([place])    Oct 28, 2024 8:30 AM CDT Medicare Annual Well Visit with Deacon Stanton MD [facility] ([facility])              [facility]  [address] [place] [address] [place] [id number]  [phone]           Preferred lab: [facility] ([facility])     The patient has been notified to complete fasting labs prior to their physical appointment.

## 2024-06-12 ENCOUNTER — MED REC SCAN ONLY (OUTPATIENT)
Dept: PHYSICAL MEDICINE AND REHAB | Facility: CLINIC | Age: 74
End: 2024-06-12

## 2024-06-16 DIAGNOSIS — I10 ESSENTIAL HYPERTENSION: ICD-10-CM

## 2024-06-20 RX ORDER — AMLODIPINE BESYLATE 5 MG/1
5 TABLET ORAL DAILY
Qty: 90 TABLET | Refills: 3 | Status: SHIPPED | OUTPATIENT
Start: 2024-06-20

## 2024-06-20 NOTE — TELEPHONE ENCOUNTER
Requested Prescriptions     Pending Prescriptions Disp Refills    AMLODIPINE 5 MG Oral Tab [Pharmacy Med Name: Amlodipine Besylate 5 Mg Tab [facility]] 90 tablet 0     Sig: TAKE ONE TABLET BY MOUTH ONCE DAILY     LOV 10/24/2023     Patient was asked to follow-up in: Follow-up not documented in note    Appointment scheduled: 10/28/2024 Deacon Stanton MD     Medication NOT refilled per protocol.    Hypertension Medications Protocol [id number]/16/2024 08:29 AM   Protocol Details Last BP reading less than 140/90    CMP or BMP in past 12 months    In person appointment or virtual visit in the past 12 mos or appointment in next 3 mos    EGFRCR or GFRNAA > 50

## 2024-07-01 ENCOUNTER — PROCEDURE VISIT (OUTPATIENT)
Dept: PHYSICAL MEDICINE AND REHAB | Facility: CLINIC | Age: 74
End: 2024-07-01
Payer: MEDICARE

## 2024-07-01 NOTE — PROCEDURES
[facility]  [address]  [address]  [place] [address]  Phone: [phone]  Fax: [phone]    ELECTRODIAGNOSTIC REPORT          Patient: Win Meyers Hand Dominance:  R   Patient ID: [id number] Referring Dr:  Dr. Ryan   Sex: Male Test Dr:  Dr. Ryan   YOB: 1950           Visit Date: 74 Years 1 Months Examining MD:     Age: 74 Years 1 Months Referred by: Connor   Height: 5 feet 9 inch     Referred for: EMG-RLE. Denies DM, thyroid disease or daily alcohol use. +pain and N/T. Pain in the medial thigh. Cramping previously in the right shin/calf.                Summary    The motor conduction test was normal in all 2 of the tested nerves: R Peroneal - EDB, R Tibial - AH.    The sensory conduction test was normal in all 1 of the tested nerves: R Sural - Ankle (Calf).    The needle EMG study was normal in all 5 tested muscles: R. Vastus medialis, R. Tibialis anterior, R. Tibialis posterior, R. Peroneus longus, R. Gastrocnemius (Medial head).          Conclusion:       This is a normal study.    1.  There is no evidence to support a right-sided peroneal or tibial mononeuropathy.  2.  There is no evidence to support a right-sided lumbosacral radiculopathy.  3.  There is no evidence to support a length-dependent peripheral polyneuropathy.        Gary Ryan, DO  Interventional Spine and Sports Medicine Specialist   Physical Medicine and Rehabilitation  [address] [address]    [facility]  [address] [place] [address]        ________________________________               Motor NCS      Nerve / Sites Muscle Latency Amplitude Amp % Duration Segments Distance Lat Diff Velocity     ms mV % ms  cm ms m/s   R Peroneal - EDB      Ankle EDB 4.79 4.1 100 5.99 Ankle - EDB 8        Ref.  ?6.20 ?2.0 ?100  Ref.         Fib head EDB 11.77 3.5 85.9 6.46 Fib head - Ankle 30 6.98 43      Ref.     ?80  Ref.   ?40      Pop fossa EDB 14.38 3.5 85.9  Pop fossa - Fib head 11 2.60 42      Ref.      Ref.   ?40   R Tibial - AH      Ankle AH 4.74 7.2 100 5.31 Ankle - AH 8        Ref.  ?6.20 ?3.0 ?100  Ref.         Pop fossa AH 13.39 5.7 79.9 7.03 Pop fossa - Ankle 39 8.65 45      Ref.    ?71  Ref.   ?39       Sensory NCS      Nerve / Sites Rec. Site Onset Lat Peak Lat NP Amp PP Amp Segments Distance Velocity     ms ms µV µV  cm m/s   R Sural - Ankle (Calf)      Calf Ankle 2.97 3.91 7.6 9.5 Calf - Ankle 10 34      Ref.   ?4.20 ?5.0 ?5.0 Ref.        Calf Ankle 3.33 4.38 9.5 11.8          EMG Summary Table     Spontaneous MUAP Recruitment   Muscle Nerve Roots IA Fib PSW Fasc H.F. Comments Amp Dur. PPP Pattern   R. Vastus medialis Femoral L2-L4 N None None None None Normal N N N N   R. Tibialis anterior Deep peroneal (Fibular) L4-L5 N None None None None Normal N N N N   R. Tibialis posterior Tibial L4-L5 N None None None None Normal N N N N   R. Peroneus longus Peroneal L5-S1 N None None None None Normal N N N N   R. Gastrocnemius (Medial head) Tibial S1-S2 N None None None None Normal N N N N

## 2024-07-05 ENCOUNTER — HOSPITAL ENCOUNTER (OUTPATIENT)
Dept: MRI IMAGING | Age: 74
Discharge: HOME | End: 2024-07-05
Attending: PHYSICAL MEDICINE & REHABILITATION
Payer: MEDICARE

## 2024-07-05 DIAGNOSIS — M79.651 PAIN OF RIGHT THIGH: ICD-10-CM

## 2024-07-05 PROCEDURE — 73718 MRI LOWER EXTREMITY W/O DYE: CPT | Performed by: PHYSICAL MEDICINE & REHABILITATION

## 2024-07-18 ENCOUNTER — OFFICE VISIT (OUTPATIENT)
Dept: PHYSICAL MEDICINE AND REHAB | Facility: CLINIC | Age: 74
End: 2024-07-18
Payer: MEDICARE

## 2024-07-18 VITALS — RESPIRATION RATE: 18 BRPM | BODY MASS INDEX: 23.11 KG/M2 | WEIGHT: 156 LBS | HEIGHT: 69 IN

## 2024-07-18 DIAGNOSIS — M76.891 ADDUCTOR TENDINITIS OF RIGHT HIP: ICD-10-CM

## 2024-07-18 DIAGNOSIS — M79.651 PAIN OF RIGHT THIGH: ICD-10-CM

## 2024-07-18 DIAGNOSIS — M47.816 LUMBAR SPONDYLOSIS: Primary | ICD-10-CM

## 2024-07-18 PROCEDURE — 99214 OFFICE O/P EST MOD 30 MIN: CPT | Performed by: PHYSICAL MEDICINE & REHABILITATION

## 2024-07-18 NOTE — PROGRESS NOTES
RETURN PATIENT VISIT    CHIEF COMPLAINT  Right thigh pain  Axial low back pain     INTERVAL HISTORY  Luigi Walden is a 74 year old who was last seen in clinic on 5/29/2024, at that visit MRI of the right thigh was obtained.  He is here to review.    Patient states his symptoms in the right improved.  He has less pain and stiffness but endorses some instability in the right knee.  Denies any falls.  He stopped physical therapy in June and has been doing pretty well since that time.  Current level pain is rated 1/10.  He continues use of Advil, Flexeril as well as lidocaine patch.    He continues to have ongoing axial low back pain however.  Made worse with bending twisting and prolonged walking.  Pain is located on the left greater than right sides.  No radiation of symptoms.    Previously had a very robust response to a right-sided  to facet steroid injection.  He is interested in repeating this on the left side and right side.    Chief Complaint   Patient presents with    Follow - Up     Return patient. LOV 5/29/24. Here for review of MRI and EMG results. Symptoms are improved. Less pain and stiffness. + instability to R knee, denies falls. Stopped PT late June until he reviews the results with you. Denies N/T. CPL 1/10. Continues Advil, flexeril and lido patch.          REVIEW OF SYSTEMS  Review of systems was completed with the patient today as pertinent to today's visit    PHYSICAL EXAMINATION  CONSTITUTIONAL: Well-appearing, in no apparent distress  EYES: No scleral icterus or conjunctival hemorrhage  CARDIOVASCULAR: Skin warm and well-perfused, no peripheral edema  RESPIRATORY: Breathing unlabored without accessory muscle use  PSYCHIATRIC: Alert, cooperative, appropriate mood and affect  SKIN: No lesions or rashes on exposed skin  MUSCULOSKELETAL: Slightly restricted range of motion in the lumbar spine in extension with exacerbation of axial low back pain.  Facet loading is positive bilaterally.   Minimal tenderness palpation to the right abductor muscles.  NEUROLOGIC: Well-maintained strength and sensation in bilateral lower extremities.    REVIEW OF PRIOR X-RAYS/STUDIES  Independently reviewed the MRI of the right thigh dated 7/5/2024 which reveals moderate osteoarthritis in the right hip.  No significant soft tissue injury, muscular edema tendinitis or tendinosis.    IMPRESSION/DIAGNOSIS  1.    Encounter Diagnoses   Name Primary?    Lumbar spondylosis Yes    Pain of right thigh     Adductor tendinitis of right hip        Right  TREATMENT/PLAN  Patient doing pretty well from a right thigh standpoint, will move forward with repeating facet intervention.  Will target the bilateral L4-5 and L5-S1 facet joints    Education was provided regarding the above impression/diagnosis and treatment options/plan were discussed.  All questions were answered during today's visit.  Patient will contact clinic if any other questions or concerns.          Gary Ryan,   Interventional Spine and Sports Medicine Specialist   Physical Medicine and Rehabilitation  [address] [address]    [address] [place] [address]

## 2024-07-19 ENCOUNTER — TELEPHONE (OUTPATIENT)
Dept: PHYSICAL MEDICINE AND REHAB | Facility: CLINIC | Age: 74
End: 2024-07-19

## 2024-07-19 DIAGNOSIS — M47.816 LUMBAR SPONDYLOSIS: Primary | ICD-10-CM

## 2024-07-19 NOTE — TELEPHONE ENCOUNTER
Per CMS Guidelines- no authorization is required for Bilateral L4/5 and L5/S1 facet joint injections under local. (30 min appt)  Status: authorization is not required based on medical necessity however may by subject to review once claim is submitted.    If this procedure is being performed at Outpatient Hospital[facility]- authorization is required, please notify this writer. ( [facility] in Network with [facility])

## 2024-07-23 NOTE — TELEPHONE ENCOUNTER
Patient has been scheduled for  Bilateral L4/5 and L5/S1 facet joint injections under local. on 7/29/24 at the [facility] with Dr. Ryan.   Anesthesia type:  Lcal  Please note: The [facility] will call the business day prior to discuss the exact time/arrival and additional instructions for your appointment.  Patient was advised that if he/she does receive the covid vaccine it needs to be at least 2 weeks before or after the injection.  Medications and allergies reviewed.  Educated to hold NSAIDS (Aleve, Ibuprofen, Motrin, Advil) and anti-inflammatories (Meloxicam, Naproxen, Diclofenac, Celebrex) and for cervical injections must hold Multi-Vitamins, Vitamin E, Fish Oil/Omega-3.  If patient is receiving MAC/IVCS, weight loss oral/injectable medications will need to be held for 7 days prior to injection.  Patient informed to fast 8 hours prior to procedure and 10-12 hours prior to procedure with IVCS/MAC if patient is on a weight loss medication.   If on blood thinner, clearance has been received and approved to hold this medication by provider.   Patient informed of [facility]'s  policy:  he/she will need a  to and from procedure and must be on site for their entirety of their visit, if their ride is unable to the procedure will be cancelled.   [facility] is located in the [facility] 1st floor [address] [place] [address].   may park in the yellow/purple parking lot.  Patient verbalized understanding and agrees with plan.  Scheduled in Epic: Yes  Scheduled in Surgical Case: Yes  Follow up appointment made: NOV: Visit date not found

## 2024-09-23 ENCOUNTER — OFFICE VISIT (OUTPATIENT)
Dept: FAMILY MEDICINE CLINIC | Facility: CLINIC | Age: 74
End: 2024-09-23
Payer: MEDICARE

## 2024-09-23 VITALS
HEIGHT: 69 IN | DIASTOLIC BLOOD PRESSURE: 82 MMHG | TEMPERATURE: 98 F | OXYGEN SATURATION: 98 % | WEIGHT: 155 LBS | BODY MASS INDEX: 22.96 KG/M2 | SYSTOLIC BLOOD PRESSURE: 130 MMHG | HEART RATE: 62 BPM | RESPIRATION RATE: 16 BRPM

## 2024-09-23 DIAGNOSIS — J01.10 ACUTE NON-RECURRENT FRONTAL SINUSITIS: Primary | ICD-10-CM

## 2024-09-23 DIAGNOSIS — B34.9 VIRAL SYNDROME: ICD-10-CM

## 2024-09-23 NOTE — PATIENT INSTRUCTIONS
-Push fluids  -Cool mist humidifier  -Tea with honey  -Tylenol/motrin as needed  -Flonase  -Sinus rinse  -Must be seen with worsening symptoms

## 2024-09-23 NOTE — PROGRESS NOTES
CHIEF COMPLAINT:     Chief Complaint   Patient presents with    Sinus Problem     right side sinuse pressure is worse, postnasal drip  OTC cold and flu, affrin       HPI:   Luigi Waledn is a 74 year old male who presents with URI symptoms for about 5 days.  Covid test at home negative.  Patient with history of sinus infections.  He explains he also has a history of sinus surgeries.  He is concerned because his drainage has become thick and yellow.  Patient is seeking an antibiotic and a medrol dose pack.       Fever:     Yes []     No [x]       Chills:       Yes []    No [x]       Sweats:     Yes []     No [x]     Fatigue: Yes []     No [x]   Stuffy nose : Yes [x]     No []      Sinus pressure: Yes [x]     No []    Frontal-Right side  Sore throat:   Yes [x]      No []   Right side   Ear Pain:  Yes []      No [x]    R is full  Cough:    Yes []     No [x]    +PND   Dry []     Productive []   Shortness of breath:  Yes []   No [x]     Wheezing:   Yes []   No [x]     Chest pain/pressure:     Yes []     No [x]      GI symptoms: Yes []     No [x]                     Nausea  []; Vomiting  []; Diarrhea  [] ; Upset stomach [];   Abdominal Pain  []       Patient has tried aftrin, flonase, Sinus rinses, \"cold and flu\" medication  for symptoms.       Current Outpatient Medications   Medication Sig Dispense Refill    amoxicillin clavulanate 875-125 MG Oral Tab Take 1 tablet by mouth 2 (two) times daily for 10 days. 20 tablet 0    amLODIPine 5 MG Oral Tab Take 1 tablet (5 mg total) by mouth daily. 90 tablet 3    Ibuprofen (ADVIL) 200 MG Oral Cap Take 1-2 capsules (200-400 mg total) by mouth as needed.      lidocaine 5 % External Patch Place 2 patches onto the skin in the morning. 60 patch 1    montelukast (SINGULAIR) 10 MG Oral Tab Take 1 tablet (10 mg total) by mouth in the morning. 90 tablet 1    cyclobenzaprine 10 MG Oral Tab Take 1 tablet (10 mg total) by mouth 3 (three) times daily as needed for Muscle spasms. 60  tablet 1    cetirizine 10 MG Oral Tab Take 1 tablet (10 mg total) by mouth daily.        Past Medical History:    Cancer (HCC)    Essential hypertension    High blood pressure      Past Surgical History:   Procedure Laterality Date    Lipoma removal  1994    on back.  done in [place]    Sinus surgery             Social History     Socioeconomic History    Marital status:    Occupational History    Occupation: Retired    Tobacco Use    Smoking status: Never    Smokeless tobacco: Never   Vaping Use    Vaping status: Never Used   Substance and Sexual Activity    Alcohol use: Yes     Alcohol/week: 1.0 standard drink of alcohol     Types: 1 Glasses of wine per week     Comment: one glass wine per week    Drug use: Never   Other Topics Concern    Caffeine Concern No    Exercise Yes     Comment: golfs 4 times weekly, Walking daily, swims twice weekly    Seat Belt Yes    Sleep Concern No     Social Determinants of Health      [facility]    Social Connections    [facility] from [facility], [facility]    Housing Stability         REVIEW OF SYSTEMS:   GENERAL: Normal appetite  SKIN: no rashes or abnormal skin lesions  HEENT: See HPI  LUNGS: denies shortness of breath or wheezing, See HPI  CARDIOVASCULAR: denies chest pain or palpitations   GI: denies N/V/C or abdominal pain  NEURO: + sinus headaches    EXAM:   /82   Pulse 62   Temp 97.5 °F (36.4 °C)   Resp 16   Ht 5' 9\" (1.753 m)   Wt 155 lb (70.3 kg)   SpO2 98%   BMI 22.89 kg/m²   GENERAL: well developed, well nourished,in no apparent distress  SKIN: no rashes,no suspicious lesions  HEAD: atraumatic, normocephalic.  No tenderness on palpation of maxillary sinuses.  + tenderness on palpation of R frontal sinus  EYES: conjunctiva clear, EOM intact  EARS: TM's not erythematous, no bulging, no retraction, + white fluid, bony landmarks intact.  EACs WNL  BL.    NOSE: Nostrils patent, no nasal discharge, nasal mucosa pink  THROAT: oral mucosa pink, moist. Posterior pharynx  erythematous or injected. No exudates.  No uvular deviation, drooling, muffled voice, hot potato voice, trismus, or signs of abscess.   NECK: Supple, non-tender  LUNGS: clear to auscultation bilaterally, no wheezes or rhonchi. Breathing is non labored.  CARDIO: RRR without murmur  EXTREMITIES: no cyanosis, clubbing or edema  LYMPH:  No anterior cervical lymphadenopathy. No submandibular lymphadenopathy.  No posterior cervical or occipital lymphadenopathy.    No results found for this or any previous visit (from the past 24 hour(s)).    ASSESSMENT AND PLAN:   Luigi Walden is a 74 year old male who presents with symptoms that are consistent with    ASSESSMENT:   Encounter Diagnoses   Name Primary?    Acute non-recurrent frontal sinusitis Yes    Viral syndrome        PLAN: Meds as below.  See patient Instructions.  See attached patient references.   -Discussed given ENT history and sinus surgeries, will provide antibiotic. Discussed wait and see approach as antibiotics are not indicated at this point.  Patient advised to wait a few more days before starting abx.  Dicussed if symptoms start to improve the next few days, there is no need for an antibiotic. Patient voices understanding.     Meds & Refills for this Visit:  Requested Prescriptions     Signed Prescriptions Disp Refills    amoxicillin clavulanate 875-125 MG Oral Tab 20 tablet 0     Sig: Take 1 tablet by mouth 2 (two) times daily for 10 days.       Risks, benefits, and side effects of medication explained and discussed.      Patient Instructions   -Push fluids  -Cool mist humidifier  -Tea with honey  -Tylenol/motrin as needed  -Flonase  -Sinus rinse  -Must be seen with worsening symptoms        The patient/parent indicates understanding of these issues and agrees to the plan.

## 2024-09-26 ENCOUNTER — OFFICE VISIT (OUTPATIENT)
Dept: PHYSICAL MEDICINE AND REHAB | Facility: CLINIC | Age: 74
End: 2024-09-26
Payer: MEDICARE

## 2024-09-26 DIAGNOSIS — S76.211D STRAIN OF ADDUCTOR MAGNUS MUSCLE OF RIGHT LOWER EXTREMITY, SUBSEQUENT ENCOUNTER: Primary | ICD-10-CM

## 2024-09-26 PROCEDURE — 99213 OFFICE O/P EST LOW 20 MIN: CPT | Performed by: PHYSICAL MEDICINE & REHABILITATION

## 2024-09-26 NOTE — PROGRESS NOTES
RETURN PATIENT VISIT    CHIEF COMPLAINT  Low back pain  Right knee pain    INTERVAL HISTORY  Luigi Walden is a 74 year old who was last seen in clinic on 7/18/2024, at that visit there were plans to address his bilateral low back pain.  He then saw me in the procedural suite on 7/29/2024 where he underwent bilateral L4-5 and L5-S1 facet joint corticosteroid injections under fluoroscopic guidance.  He endorses 90% relief from his injections in the low back thus far and is very pleased with his progress from the standpoint.    In regards to his right knee he states that he has pain radiating through the thigh to the groin on the right side.  He endorses chronic complaints in his right knee as well as groin which has not responded to myofascial treatments and physical therapy yet. Overall notes about 80% relief so far with his adducotor.    Chief Complaint   Patient presents with    Follow - Up     LOV 7/18/24 Patient c/o right knee pain that radiates through his thigh into his groin. Received BL L4/5 and L5/S1 facet inj 7/29/24 and has 90% relief from this as far as his back pain. Continues to c/o chronic knee and groin pain. LOP 4/10     Swiiming is fine. Biking bothers him. He will feel it while golfing.     REVIEW OF SYSTEMS  Review of systems was completed with the patient today as pertinent to today's visit    PHYSICAL EXAMINATION  CONSTITUTIONAL: Well-appearing, in no apparent distress  EYES: No scleral icterus or conjunctival hemorrhage  CARDIOVASCULAR: Skin warm and well-perfused, no peripheral edema  RESPIRATORY: Breathing unlabored without accessory muscle use  PSYCHIATRIC: Alert, cooperative, appropriate mood and affect  SKIN: No lesions or rashes on exposed skin  MUSCULOSKELETAL: He has an tenderness palpation over the medial femoral condyle near the distal attachment of the adductors on the right side.  Some extension into the muscle belly proximally but no significant tenderness over the  muscle belly medially.  NEUROLOGIC: Well-maintained strength and sensation in the bilateral lower extremities.    REVIEW OF PRIOR X-RAYS/STUDIES  Independently reviewed the plain film radiographs of the right knee dated 12/18/2023 which reveals largely well-maintained right knee joint spaces without significant osseous abnormality.    Prior radiographs of the lumbar spine revealed spondylolisthesis of L4 over 5 with degenerative changes in the lower part of lumbar spine.    Prior MRI of the lumbar spine dated 1/8/2018 reveals degenerative changes in the lower part of lumbar spine.  Possible impingement of the traversing left L5 nerve root.    IMPRESSION/DIAGNOSIS  1.    Encounter Diagnosis   Name Primary?    Strain of adductor katelynn muscle of right lower extremity, subsequent encounter Yes     TREATMENT/PLAN  Generally speaking patient is doing very well.  90% improvement in his low back complaints, 80 to 85% improvement in his left thigh/adductor pain complaints.  We discussed reinitiation of physical therapy as this was providing him with benefit previously.  We did briefly discuss PRP injection however past this there is not much option for his myofascial pain in the right thigh it seems.  He is at a functional state however.    Education was provided regarding the above impression/diagnosis and treatment options/plan were discussed.  All questions were answered during today's visit.  Patient will contact clinic if any other questions or concerns.          Gary Ryan,   Interventional Spine and Sports Medicine Specialist   Physical Medicine and Rehabilitation  [address] [address]    [address] [place] [address]

## 2024-10-01 ENCOUNTER — LAB ENCOUNTER (OUTPATIENT)
Dept: LAB | Age: 74
End: 2024-10-01
Attending: FAMILY MEDICINE
Payer: MEDICARE

## 2024-10-01 DIAGNOSIS — Z13.6 SCREENING FOR CARDIOVASCULAR CONDITION: ICD-10-CM

## 2024-10-01 DIAGNOSIS — Z85.46 HISTORY OF PROSTATE CANCER: ICD-10-CM

## 2024-10-01 LAB
ALBUMIN SERPL-MCNC: 4.6 G/DL (ref 3.2–4.8)
ALBUMIN/GLOB SERPL: 2.3 {RATIO} (ref 1–2)
ALP LIVER SERPL-CCNC: 78 U/L
ALT SERPL-CCNC: 19 U/L
ANION GAP SERPL CALC-SCNC: 10 MMOL/L (ref 0–18)
AST SERPL-CCNC: 19 U/L (ref ?–34)
BILIRUB SERPL-MCNC: 0.6 MG/DL (ref 0.2–1.1)
BUN BLD-MCNC: 17 MG/DL (ref 9–23)
CALCIUM BLD-MCNC: 9.6 MG/DL (ref 8.7–10.4)
CHLORIDE SERPL-SCNC: 108 MMOL/L (ref 98–112)
CHOLEST SERPL-MCNC: 181 MG/DL (ref ?–200)
CO2 SERPL-SCNC: 27 MMOL/L (ref 21–32)
CREAT BLD-MCNC: 1.07 MG/DL
EGFRCR SERPLBLD CKD-EPI 2021: 72 ML/MIN/1.73M2 (ref 60–?)
FASTING PATIENT LIPID ANSWER: YES
FASTING STATUS PATIENT QL REPORTED: YES
GLOBULIN PLAS-MCNC: 2 G/DL (ref 2–3.5)
GLUCOSE BLD-MCNC: 91 MG/DL (ref 70–99)
HDLC SERPL-MCNC: 53 MG/DL (ref 40–59)
LDLC SERPL CALC-MCNC: 114 MG/DL (ref ?–100)
NONHDLC SERPL-MCNC: 128 MG/DL (ref ?–130)
OSMOLALITY SERPL CALC.SUM OF ELEC: 301 MOSM/KG (ref 275–295)
POTASSIUM SERPL-SCNC: 4.3 MMOL/L (ref 3.5–5.1)
PROT SERPL-MCNC: 6.6 G/DL (ref 5.7–8.2)
PSA SERPL-MCNC: 0.19 NG/ML (ref ?–4)
SODIUM SERPL-SCNC: 145 MMOL/L (ref 136–145)
TRIGL SERPL-MCNC: 75 MG/DL (ref 30–149)
VLDLC SERPL CALC-MCNC: 13 MG/DL (ref 0–30)

## 2024-10-01 PROCEDURE — 80053 COMPREHEN METABOLIC PANEL: CPT

## 2024-10-01 PROCEDURE — 36415 COLL VENOUS BLD VENIPUNCTURE: CPT

## 2024-10-01 PROCEDURE — 84153 ASSAY OF PSA TOTAL: CPT

## 2024-10-01 PROCEDURE — 80061 LIPID PANEL: CPT

## 2024-10-24 PROBLEM — Z85.831 HISTORY OF SARCOMA: Status: ACTIVE | Noted: 2024-10-24

## 2024-10-24 PROBLEM — C49.11: Status: RESOLVED | Noted: 2024-10-24 | Resolved: 2024-10-24

## 2024-10-28 ENCOUNTER — OFFICE VISIT (OUTPATIENT)
Dept: FAMILY MEDICINE CLINIC | Facility: CLINIC | Age: 74
End: 2024-10-28
Payer: MEDICARE

## 2024-10-28 VITALS
RESPIRATION RATE: 16 BRPM | WEIGHT: 157 LBS | BODY MASS INDEX: 23.25 KG/M2 | SYSTOLIC BLOOD PRESSURE: 130 MMHG | HEART RATE: 60 BPM | DIASTOLIC BLOOD PRESSURE: 80 MMHG | OXYGEN SATURATION: 95 % | HEIGHT: 69 IN

## 2024-10-28 DIAGNOSIS — J32.9 CHRONIC SINUSITIS, UNSPECIFIED LOCATION: ICD-10-CM

## 2024-10-28 DIAGNOSIS — G89.29 CHRONIC LOW BACK PAIN, UNSPECIFIED BACK PAIN LATERALITY, UNSPECIFIED WHETHER SCIATICA PRESENT: ICD-10-CM

## 2024-10-28 DIAGNOSIS — C49.11: ICD-10-CM

## 2024-10-28 DIAGNOSIS — I10 PRIMARY HYPERTENSION: ICD-10-CM

## 2024-10-28 DIAGNOSIS — Z00.00 ENCOUNTER FOR ANNUAL HEALTH EXAMINATION: Primary | ICD-10-CM

## 2024-10-28 DIAGNOSIS — M54.50 CHRONIC LOW BACK PAIN, UNSPECIFIED BACK PAIN LATERALITY, UNSPECIFIED WHETHER SCIATICA PRESENT: ICD-10-CM

## 2024-10-28 DIAGNOSIS — Z85.46 HISTORY OF PROSTATE CANCER: ICD-10-CM

## 2024-10-28 RX ORDER — MIRABEGRON 50 MG/1
50 TABLET, FILM COATED, EXTENDED RELEASE ORAL DAILY
COMMUNITY
Start: 2024-08-29

## 2024-10-28 RX ORDER — MIRABEGRON 50 MG/1
50 TABLET, FILM COATED, EXTENDED RELEASE ORAL DAILY
COMMUNITY

## 2024-10-28 RX ORDER — CYCLOBENZAPRINE HCL 10 MG
10 TABLET ORAL 3 TIMES DAILY PRN
Qty: 60 TABLET | Refills: 1 | Status: SHIPPED | OUTPATIENT
Start: 2024-10-28

## 2024-10-28 RX ORDER — MONTELUKAST SODIUM 10 MG/1
10 TABLET ORAL DAILY
Qty: 90 TABLET | Refills: 3 | Status: SHIPPED | OUTPATIENT
Start: 2024-10-28

## 2024-10-28 NOTE — PROGRESS NOTES
Subjective:   Luigi Walden is a 74 year old male who presents for a Subsequent Annual Wellness visit (Pt already had Initial Annual Wellness) and scheduled follow up of multiple significant but stable problems.   Preventative Screening  Colonoscopy - 12/2026.  Repeat 10 yrs.   Prostate - h/o prostate CA.  Was having nocturia.  Rx for Myrbetriq but has not started it quite yet, as its only just been approved.  PSA 0.19.   Immunizations - TDaP 2016.  Shingrix UTD x 2.  PNA UTD x 2.  Flu and COVID annually.      HTN - BP controlled today.  Taking amlodipine.      R leg - pain in the knee and groin.  Has been seeing Dr. Ryan who helped with his back pain.  Has had PT, MRI, CT, XR.  No clear pathology.  Now doing PT again - which is helping.  Not holding him back right now, but did for a while.  Flexeril before golf.      Skin - h/o cancers.  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  Other skin cancers.  Seeing the specialist regular.     Allergy - takes Singulair. In need of refill.     History/Other:   Fall Risk Assessment:   He has been screened for Falls and is low risk.      Cognitive Assessment:   He had a completely normal cognitive assessment - see flowsheet entries     Functional Ability/Status:   Luigi Walden has some abnormal functions as listed below:  He has Vision problems based on screening of functional status.       Depression Screening (PHQ):  PHQ-2 SCORE: 0  , done 10/28/2024             Advanced Directives:   He does have a Living Will but we do NOT have it on file in Epic.    He does have a POA but we do NOT have it on file in Epic.    Discussed Advance Care Planning with patient (and family/surrogate if present). Standard forms made available to patient in After Visit Summary.      Patient Active Problem List   Diagnosis    Chronic sinusitis    HTN (hypertension)    Chronic right shoulder pain    Synovial cyst of lumbar facet joint    Pes anserinus tendinitis of right lower extremity     History of prostate cancer    h/o shingles 4/2019    History of sarcoma     Allergies:  He is allergic to avelox [moxifloxacin], bactrim [sulfamethoxazole w/trimethoprim], ciprofloxacin, and levaquin [levofloxacin].    Current Medications:  Outpatient Medications Marked as Taking for the 10/28/24 encounter (Office Visit) with Deacon Stanton MD   Medication Sig    Mirabegron ER 50 MG Oral Tablet 24 Hr Take 1 tablet (50 mg total) by mouth daily.    Mirabegron ER (MYRBETRIQ) 50 MG Oral Tablet 24 Hr Take 1 tablet (50 mg total) by mouth daily.    cyclobenzaprine 10 MG Oral Tab Take 1 tablet (10 mg total) by mouth 3 (three) times daily as needed for Muscle spasms.    montelukast (SINGULAIR) 10 MG Oral Tab Take 1 tablet (10 mg total) by mouth daily.    amLODIPine 5 MG Oral Tab Take 1 tablet (5 mg total) by mouth daily.    Ibuprofen (ADVIL) 200 MG Oral Cap Take 1-2 capsules (200-400 mg total) by mouth as needed.    lidocaine 5 % External Patch Place 2 patches onto the skin in the morning.    cetirizine 10 MG Oral Tab Take 1 tablet (10 mg total) by mouth daily.       Medical History:  He  has a past medical history of Cancer (HCC) (04/01/2015), Essential hypertension, and High blood pressure.  Surgical History:  He  has a past surgical history that includes sinus surgery   and lipoma removal (1994).   Family History:  His family history includes Diabetes in his brother; Hypertension in his father.  Social History:  He  reports that he has never smoked. He has never used smokeless tobacco. He reports current alcohol use of about 1.0 standard drink of alcohol per week. He reports that he does not use drugs.    Tobacco:  He has never smoked tobacco.    CAGE Alcohol Screen:   CAGE screening score of 0 on 10/28/2024, showing low risk of alcohol abuse.      Patient Care Team:  Deacon Stanton MD as PCP - General (Family Medicine)  Simone Blair MD (OTOLARYNGOLOGY)  Juanis Thomas PA-C (Physician Assistant)  Samantha Rowland  LISA, PT as Physical Therapist  Cece Childress PT as Physical Therapist    Review of Systems  Constitutional: negative  Eyes: negative  Ears, nose, mouth, throat, and face: negative  Respiratory: negative  Cardiovascular: negative  Gastrointestinal: negative  Genitourinary: negative  Neurological: negative  MSK: pain upper inner R knee region.     Objective:   Physical Exam  General Appearance:  Alert, cooperative, no distress, appears stated age   Head:  Normocephalic, without obvious abnormality, atraumatic   Eyes:  PERRL, conjunctiva/corneas clear, EOM's intact   Neck: Supple, symmetrical, trachea midline, no adenopathy   Back:   Symmetric, no curvature, ROM normal, no CVA tenderness   Lungs:   Clear to auscultation bilaterally, respirations unlabored   Chest Wall:  No tenderness or deformity   Heart:  Regular rate and rhythm, S1, S2 normal, no murmur, rub or gallop   Abdomen:   Soft, non-tender, bowel sounds active all four quadrants,  no masses, no organomegaly   Extremities: Extremities normal, atraumatic, no cyanosis or edema   Pulses: 2+ and symmetric   Skin: Skin color, texture, turgor normal, no rashes or lesions   Neurologic: Normal      /80   Pulse 60   Resp 16   Ht 5' 9\" (1.753 m)   Wt 157 lb (71.2 kg)   SpO2 95%   BMI 23.18 kg/m²  Estimated body mass index is 23.18 kg/m² as calculated from the following:    Height as of this encounter: 5' 9\" (1.753 m).    Weight as of this encounter: 157 lb (71.2 kg).    Medicare Hearing Assessment:   Hearing Screening    Time taken: 10/28/2024  8:25 AM  Screening Method: Whisper Test  Whisper Test Result: Pass         Visual Acuity:   Right Eye Visual Acuity: Corrected Right Eye Chart Acuity: 20/30   Left Eye Visual Acuity: Corrected Left Eye Chart Acuity: 20/25   Both Eyes Visual Acuity: Corrected Both Eyes Chart Acuity: 20/25   Able To Tolerate Visual Acuity: Yes        Assessment & Plan:     Luigi Walden was seen in the office today:  had  concerns including Health Maintenance (MAW).    1. Encounter for annual health examination  Overall well  Labs reassuring.  Not limited  Now 73yo, no c-scope indication  PSA remains low    2. Chronic low back pain, unspecified back pain laterality, unspecified whether sciatica present  Using flexeril before golfing  In need of refill.   - cyclobenzaprine 10 MG Oral Tab; Take 1 tablet (10 mg total) by mouth 3 (three) times daily as needed for Muscle spasms.  Dispense: 60 tablet; Refill: 1    3. Chronic sinusitis, unspecified location  Flares from time to time.  Using Singulair, which is effective  - montelukast (SINGULAIR) 10 MG Oral Tab; Take 1 tablet (10 mg total) by mouth daily.  Dispense: 90 tablet; Refill: 3    4. Primary hypertension  BP controlled  Stable amlodipine 5mg.     5. History of prostate cancer  Watching PSA annually  Stable.  S/p EBRT    6. Leiomyosarcoma of arm, right (HCC)  Following with [facility]  Sees them annually.  Continue routine surveillance. Dr. Yumi Flores.   PET scan in 2023, CT 2024.  Watching for metastasis.         Discussed with the patient in person that additional conditions managed outside of the normal physical, and this may incur co-payments as dictated by their insurance provider.        The patient indicates understanding of these issues and agrees to the plan.  Reinforced healthy diet, lifestyle, and exercise.      No follow-ups on file.     Deacon Stanton MD, 10/28/2024     Supplementary Documentation:   General Health:  In the past six months, have you lost more than 10 pounds without trying?: 2 - No  Has your appetite been poor?: No  Type of Diet: Balanced  How does the patient maintain a good energy level?: Appropriate Exercise;Daily Walks;Stretching;Other  How would you describe your daily physical activity?: Light  How would you describe your current health state?: Good  How do you maintain positive mental well-being?: Social Interaction;Visiting Friends;Visiting  Family  In the past six months, have you experienced urine leakage?: 1-Yes  At any time do you feel concerned for the safety/well-being of yourself and/or your children, in your home or elsewhere?: No  Have you had any immunizations at another office such as Influenza, Hepatitis B, Tetanus, or Pneumococcal?: Yes    Health Maintenance   Topic Date Due    Annual Depression Screening  01/01/2024    Annual Physical  10/24/2024    Colorectal Cancer Screening  12/15/2026    Influenza Vaccine  Completed    Fall Risk Screening (Annual)  Completed    Pneumococcal Vaccine: 65+ Years  Completed    Zoster Vaccines  Completed    COVID-19 Vaccine  Completed

## 2025-04-23 ENCOUNTER — APPOINTMENT (OUTPATIENT)
Dept: CT IMAGING | Facility: HOSPITAL | Age: 75
End: 2025-04-23
Attending: EMERGENCY MEDICINE
Payer: MEDICARE

## 2025-04-23 ENCOUNTER — APPOINTMENT (OUTPATIENT)
Dept: GENERAL RADIOLOGY | Facility: HOSPITAL | Age: 75
End: 2025-04-23
Attending: EMERGENCY MEDICINE
Payer: MEDICARE

## 2025-04-23 ENCOUNTER — HOSPITAL ENCOUNTER (EMERGENCY)
Facility: HOSPITAL | Age: 75
Discharge: HOME | End: 2025-04-23
Attending: EMERGENCY MEDICINE
Payer: MEDICARE

## 2025-04-23 VITALS
RESPIRATION RATE: 18 BRPM | BODY MASS INDEX: 22.96 KG/M2 | HEIGHT: 69 IN | HEART RATE: 57 BPM | OXYGEN SATURATION: 100 % | SYSTOLIC BLOOD PRESSURE: 144 MMHG | DIASTOLIC BLOOD PRESSURE: 85 MMHG | TEMPERATURE: 98 F | WEIGHT: 155 LBS

## 2025-04-23 DIAGNOSIS — R07.9 CHEST PAIN OF UNCERTAIN ETIOLOGY: Primary | ICD-10-CM

## 2025-04-23 LAB
ALBUMIN SERPL-MCNC: 4.6 G/DL (ref 3.2–4.8)
ALBUMIN/GLOB SERPL: 2.3 {RATIO} (ref 1–2)
ALP LIVER SERPL-CCNC: 96 U/L (ref 45–117)
ALT SERPL-CCNC: 20 U/L (ref 10–49)
ANION GAP SERPL CALC-SCNC: 11 MMOL/L (ref 0–18)
APTT PPP: 23.5 SECONDS (ref 23–36)
AST SERPL-CCNC: 25 U/L (ref ?–34)
BASOPHILS # BLD AUTO: 0.02 X10(3) UL (ref 0–0.2)
BASOPHILS NFR BLD AUTO: 0.4 %
BILIRUB SERPL-MCNC: 0.5 MG/DL (ref 0.2–1.1)
BUN BLD-MCNC: 18 MG/DL (ref 9–23)
CALCIUM BLD-MCNC: 9.4 MG/DL (ref 8.7–10.6)
CHLORIDE SERPL-SCNC: 107 MMOL/L (ref 98–112)
CO2 SERPL-SCNC: 25 MMOL/L (ref 21–32)
CREAT BLD-MCNC: 1.14 MG/DL (ref 0.7–1.3)
EGFRCR SERPLBLD CKD-EPI 2021: 67 ML/MIN/1.73M2 (ref 60–?)
EOSINOPHIL # BLD AUTO: 0.2 X10(3) UL (ref 0–0.7)
EOSINOPHIL NFR BLD AUTO: 3.6 %
ERYTHROCYTE [DISTWIDTH] IN BLOOD BY AUTOMATED COUNT: 12.3 %
GLOBULIN PLAS-MCNC: 2 G/DL (ref 2–3.5)
GLUCOSE BLD-MCNC: 120 MG/DL (ref 70–99)
HCT VFR BLD AUTO: 40.7 % (ref 39–53)
HGB BLD-MCNC: 14.2 G/DL (ref 13–17.5)
IMM GRANULOCYTES # BLD AUTO: 0.01 X10(3) UL (ref 0–1)
IMM GRANULOCYTES NFR BLD: 0.2 %
INR BLD: 1.03 (ref 0.8–1.2)
LIPASE SERPL-CCNC: 37 U/L (ref 12–53)
LYMPHOCYTES # BLD AUTO: 1.4 X10(3) UL (ref 1–4)
LYMPHOCYTES NFR BLD AUTO: 25.1 %
MCH RBC QN AUTO: 32.9 PG (ref 26–34)
MCHC RBC AUTO-ENTMCNC: 34.9 G/DL (ref 31–37)
MCV RBC AUTO: 94.4 FL (ref 80–100)
MONOCYTES # BLD AUTO: 0.46 X10(3) UL (ref 0.1–1)
MONOCYTES NFR BLD AUTO: 8.2 %
NEUTROPHILS # BLD AUTO: 3.49 X10 (3) UL (ref 1.5–7.7)
NEUTROPHILS # BLD AUTO: 3.49 X10(3) UL (ref 1.5–7.7)
NEUTROPHILS NFR BLD AUTO: 62.5 %
NT-PROBNP SERPL-MCNC: 44 PG/ML (ref ?–450)
OSMOLALITY SERPL CALC.SUM OF ELEC: 299 MOSM/KG (ref 275–295)
PLATELET # BLD AUTO: 182 10(3)UL (ref 150–450)
POTASSIUM SERPL-SCNC: 4 MMOL/L (ref 3.5–5.1)
PROT SERPL-MCNC: 6.6 G/DL (ref 5.7–8.2)
PROTHROMBIN TIME: 13.6 SECONDS (ref 11.6–14.8)
RBC # BLD AUTO: 4.31 X10(6)UL (ref 3.8–5.8)
SODIUM SERPL-SCNC: 143 MMOL/L (ref 136–145)
TROPONIN I SERPL HS-MCNC: 4 NG/L (ref ?–53)
TROPONIN I SERPL HS-MCNC: 6 NG/L (ref ?–53)
WBC # BLD AUTO: 5.6 X10(3) UL (ref 4–11)

## 2025-04-23 PROCEDURE — 84484 ASSAY OF TROPONIN QUANT: CPT | Performed by: EMERGENCY MEDICINE

## 2025-04-23 PROCEDURE — 71045 X-RAY EXAM CHEST 1 VIEW: CPT | Performed by: EMERGENCY MEDICINE

## 2025-04-23 PROCEDURE — 99285 EMERGENCY DEPT VISIT HI MDM: CPT

## 2025-04-23 PROCEDURE — 85025 COMPLETE CBC W/AUTO DIFF WBC: CPT | Performed by: EMERGENCY MEDICINE

## 2025-04-23 PROCEDURE — 71260 CT THORAX DX C+: CPT | Performed by: EMERGENCY MEDICINE

## 2025-04-23 PROCEDURE — 83690 ASSAY OF LIPASE: CPT | Performed by: EMERGENCY MEDICINE

## 2025-04-23 PROCEDURE — 85610 PROTHROMBIN TIME: CPT | Performed by: EMERGENCY MEDICINE

## 2025-04-23 PROCEDURE — 36415 COLL VENOUS BLD VENIPUNCTURE: CPT

## 2025-04-23 PROCEDURE — 93010 ELECTROCARDIOGRAM REPORT: CPT

## 2025-04-23 PROCEDURE — 85730 THROMBOPLASTIN TIME PARTIAL: CPT | Performed by: EMERGENCY MEDICINE

## 2025-04-23 PROCEDURE — 80053 COMPREHEN METABOLIC PANEL: CPT | Performed by: EMERGENCY MEDICINE

## 2025-04-23 PROCEDURE — 93005 ELECTROCARDIOGRAM TRACING: CPT

## 2025-04-23 PROCEDURE — 83880 ASSAY OF NATRIURETIC PEPTIDE: CPT | Performed by: EMERGENCY MEDICINE

## 2025-04-23 RX ORDER — ASPIRIN 81 MG/1
324 TABLET, CHEWABLE ORAL ONCE
Status: COMPLETED | OUTPATIENT
Start: 2025-04-23 | End: 2025-04-23

## 2025-04-23 NOTE — ED INITIAL ASSESSMENT (HPI)
Patient A&OX4 here with wife for right side chest pain that started about 40 minutes PTA. Patient states pain lessens but does not go away, no shortness of breath.

## 2025-04-24 ENCOUNTER — TELEPHONE (OUTPATIENT)
Dept: FAMILY MEDICINE CLINIC | Facility: CLINIC | Age: 75
End: 2025-04-24

## 2025-04-24 ENCOUNTER — PATIENT OUTREACH (OUTPATIENT)
Dept: CASE MANAGEMENT | Age: 75
End: 2025-04-24

## 2025-04-24 NOTE — ED PROVIDER NOTES
Patient Seen in: [facility]      History     Chief Complaint   Patient presents with    Chest Pain     Stated Complaint: CP    Subjective:   HPI    This is a 74-year-old male presents to emergency room with chief complaint of chest discomfort.  Patient states that approximately 40 minutes prior to arrival he was at [facility] doing some shopping when developed some right-sided chest discomfort, states it radiate towards the back, points to the pectoral region, denies tearing type chest or abdominal pain states it was burning type pain.  Denies associated lightheaded, dizzy, nausea or diaphoresis.  Denies any shortness of breath.  States pain has now resolved.  Denies ever having exertional chest pain or shortness of breath prior, denies lower extremity swelling or calf pain denies PND orthopnea.  Denies DVT or PE risk factors.  Denies cough sore throat runny nose and denies any fevers or chills.  Denies abdominal pain.  Denies melena hematochezia.  History of Present Illness               Objective:     Past Medical History:    Cancer (HCC)    Essential hypertension    High blood pressure              Past Surgical History:   Procedure Laterality Date    Lipoma removal  1994    on back.  done in [place]    Sinus surgery                    Social History     Socioeconomic History    Marital status:    Occupational History    Occupation: Retired    Tobacco Use    Smoking status: Never    Smokeless tobacco: Never   Vaping Use    Vaping status: Never Used   Substance and Sexual Activity    Alcohol use: Yes     Alcohol/week: 1.0 standard drink of alcohol     Types: 1 Glasses of wine per week     Comment: one glass wine per week    Drug use: Never   Other Topics Concern    Caffeine Concern No    Exercise Yes     Comment: golfs 4 times weekly, Walking daily, swims twice weekly    Seat Belt Yes    Sleep Concern No     Social Drivers of Health      Received from [facility]    Housing Stability                                Physical Exam     ED Triage Vitals [04/23/25 1609]   BP (!) 189/88   Pulse 63   Resp 15   Temp 98 °F (36.7 °C)   Temp src Oral   SpO2 100 %   O2 Device None (Room air)       Current Vitals:   Vital Signs  BP: 144/85  Pulse: 57  Resp: 18  Temp: 98 °F (36.7 °C)  Temp src: Oral  MAP (mmHg): (!) 103    Oxygen Therapy  SpO2: 100 %  O2 Device: None (Room air)        Physical Exam  GENERAL: Patient is awake, alert, well-appearing, in no acute distress.  HEENT:  no scleral icterus.  Mucous membranes are moist  HEART: Regular rate and rhythm, no murmurs.  LUNGS: Clear to auscultation bilaterally.  No Rales, no rhonchi, no wheezing, no stridor.  Chest wall: No tenderness, no rash  ABDOMEN: Soft, nondistended,non tender, no pulsatile mass  EXTREMITIES: No peripheral edema, no calf tenderness  Physical Exam                ED Course     Labs Reviewed   COMP METABOLIC PANEL (14) - Abnormal; Notable for the following components:       Result Value    Glucose 120 (*)     Calculated Osmolality 299 (*)     A/G Ratio 2.3 (*)     All other components within normal limits   TROPONIN I HIGH SENSITIVITY - Normal   LIPASE - Normal   PROTHROMBIN TIME (PT) - Normal   PTT, ACTIVATED - Normal   PRO BETA NATRIURETIC PEPTIDE - Normal   TROPONIN I HIGH SENSITIVITY - Normal   CBC WITH DIFFERENTIAL WITH PLATELET   RAINBOW DRAW BLUE     EKG    Rate, intervals and axes as noted on EKG Report.  Rate: 63  Rhythm: Sinus Rhythm  Reading: Normal sinus rhythm.  No ST elevation         EKG    Rate, intervals and axes as noted on EKG Report.  Rate: 57  Rhythm: Sinus Rhythm  Reading: Normal sinus rhythm, no ST elevation         Results                      Heart Score:    HEART Score      Title      Criteria Score   Age: 65 and older Age Score: 2   History: Slightly Suspicious Hx Score: 0     EKG: Normal EKG Score: 0   HTN: Yes   Hypercholesterolemia: No   Atherosclerosis/PVD: No     DM: No    BMI>30kg/m2: No   Smoking: No   Family History: No         Other Risk Factor Score: 1             Lab Results   Component Value Date    TROPHS 6 04/23/2025           HEART Score: 3        Risk of adverse cardiac event is 0.9-1.7%                       MDM        Differential diagnosis before testing includes but not limited to acute coronary syndrome, pneumonia, pneumothorax, pleurisy, pulmonary embolism, aneurysm/dissection, which is a medical condition that poses a threat to life/function    Radiographic images  I personally reviewed the radiographs and my individual interpretation shows chest x-ray, no pneumothorax  I also reviewed the official reports that showed chest x-ray:heart size is within normal limits.  Pleural spaces appear clear.  Mediastinal and hilar contours are normal.  No focal consolidation.  Increased vascular markings may reflect mild vascular congestion and volume overload.  Somewhat low lung   volumes may limit assessment.   CTA chest: There is no pulmonary embolism to the first subsegmental arterial level.      There is some questionable wall thickening of the stomach.  This may be due to incomplete distension or gastritis.  Correlation for symptoms and/or with direct visualization is suggested.     Discussion of management (consult/physicians, social work, pharmacy,ect) [facility] cardiology MADIE Ag      Course of Events during Emergency Room Visit include IV established, patient placed on cardiac monitor and pulse ox.  Was given aspirin.  CBC and chemistry unremarkable.  Initial troponin 4 BNP 44 lipase 37.  Repeat troponin was 6.  Chest x-ray and CT angiogram performed.  On reevaluation patient is symptom-free.  Discussed all results with the patient and wife.  I did recommend admission for further evaluation, patient declined wishing to go home, I discussed at length with the patient differential diagnosis, patient understands and does want to be discharged with outpatient  follow-up.  I did discuss with APN from [facility] cardiology who will arrange close follow-up.  Instructed to call in the morning.  Return to ER if any change or worsening symptoms.  Patient will.  And was discharged with wife.    Shared decision making was utilized       Discharge  I have discussed with the patient the results of test, differential diagnosis, treatment plan, warning signs and symptoms which should prompt immediate return.  They expressed understanding of these instructions and agrees to the following plan provided.  They were given written discharge instructions and agrees to return for any concerns and voiced understanding and all questions were answered.    Note to patient: The 21st Century Cures Act makes medical notes like these available to patients in the interest of transparency. However, this is a medical document intended as peer to peer communication. It is written in medical language and may contain abbreviations or verbiage that are unfamiliar. It may appear blunt or direct. Medical documents are intended to carry relevant information, facts as evident, and the clinical opinion of the practitioner.                 Medical Decision Making      Disposition and Plan     Clinical Impression:  1. Chest pain of uncertain etiology         Disposition:  Discharge  4/23/2025  8:29 pm    Follow-up:  [facility]  [address]  [facility] [id number] [phone]  Call in 1 day(s)            Medications Prescribed:  Current Discharge Medication List          Supplementary Documentation:

## 2025-04-24 NOTE — TELEPHONE ENCOUNTER
Spoke to patient for Transitions of Care call today.  Patient does not have an appointment scheduled at this time.  ER Follow-up appointment needed by 4/30/25.  Please advise.    BOOK BY DATE: 4/30/25    Clinical staff:  Please follow-up with patient and try to get them to schedule as patient would greatly benefit from ER Follow-up.  Thank you!     Patient states will schedule on his own. FYI.

## 2025-04-24 NOTE — PROGRESS NOTES
Transitions of Care Navigation  Discharge Date: 25  Contact Date: 2025    Transitions of Care Assessment:  LEATHA Initial Assessment    General:  Assessment completed with: Patient  Patient Subjective: NCM spoke with patient states he is feeling great. Patient denies any chest pain, shortness of breath, feeling dizzy or lightheaded, or any others at this time. Patient denies any fevers, chills, nausea, vomiting, or vision changes. Patient will follow up with PCP, will schedule on his own, then will schedule with cardiology. Patient denies any questions or concerns at this time.  Chief Complaint: chest pain  Verify patient name and  with patient/ caregiver: Yes    Hospital Stay/Discharge:  Tell me what you understand of why you were in the hospital or emergency department: Pt reports to ED with chest pain.  Prior to leaving the hospital were your Discharge Instructions reviewed with you?: Yes  Did you receive a copy of your written Discharge Instructions?: Yes  What questions do you have about your Discharge Instructions?: No questions at this time.  Do you feel better or worse since you left the hospital or emergency department?: Better    Follow - Up Appointment:  Do you have a follow-up appointment?: No  Are there any barriers to getting to your follow-up appointment?: No    Home Health/DME:  Prior to leaving the hospital was Home Health (HH) arranged for you?: N/A  Are HH needs identified by staff during the assessment?: No     Prior to leaving the hospital or emergency department was Durable Medical Equipment (DME), medical supplies, or infusions arranged for you?: N/A  Are DME/medical supply/infusions needs identified by staff during this assessment?: No     Medications/Diet:  Did any of your medications change, during or after your hospital stay or ED visit?: No  Do you understand what your medications are for and possible side effects?: Yes  Are there any reasons that keep you from taking your  medication as prescribed?: No  Any concerns about medication refills?: No    Were you given a different diet per your Discharge Instructions?: No  Reason: n/a     Questions/Concerns:  Do you have any questions or concerns that have not been discussed?: No   LEATHA Follow-up Assessment    General:  Assessment completed with: Patient  Patient Subjective: NCM spoke with patient states he is feeling great. Patient denies any chest pain, shortness of breath, feeling dizzy or lightheaded, or any others at this time. Patient denies any fevers, chills, nausea, vomiting, or vision changes. Patient will follow up with PCP, will schedule on his own, then will schedule with cardiology. Patient denies any questions or concerns at this time.  Chief Complaint: chest pain      Nursing Interventions:  All discharge instructions reviewed with the patient. Reviewed when to call MD vs when to call 911 or go the ED. Educated patient on the importance of taking all meds as prescribed as well as close f/u with PCP/specialists. Pt verbalized understanding and will contact the office with any further questions or concerns. Patient denies fevers, chills, nausea, vomiting, shortness of breath, chest pain, or any other symptoms at this time.  NCM attempted to schedule HFU, patient declined will call PCP/TCC office directly. NC sent TE to PCP office re: assistance in scheduling HFU appt. [facility] provided contact information for any further questions/concerns. Patient verbalized understanding and agreeable.         Medications:  Medication Reconciliation:  I am aware of an inpatient discharge within the last 30 days.  The discharge medication list has been reconciled with the patient's current medication list and reviewed by me. See medication list for additions of new medication, and changes to current doses of medications and discontinued medications.  Current Medications[1]      Follow-up Appointments:      Transitional Care Clinic  Was TCC  Ordered: No      Primary Care Provider (If no TCC appointment)  Does patient already have a PCP appointment scheduled? No  Nurse Care Manager Attempted to schedule PCP office ER Follow-up appointment with patient   -If no appointment scheduled: Explain: pt will schedule on his own.     Specialist  Does the patient have any other follow-up appointment(s) need to be scheduled? Yes   -If yes: Nurse Care Manager reviewed upcoming specialist appointments with patient: Yes   -Does the patient need assistance scheduling appointment(s): No      Book By Date: 4/30/25       [1]   Current Outpatient Medications   Medication Sig Dispense Refill    Mirabegron ER 50 MG Oral Tablet 24 Hr Take 1 tablet (50 mg total) by mouth in the morning.      Mirabegron ER (MYRBETRIQ) 50 MG Oral Tablet 24 Hr Take 1 tablet (50 mg total) by mouth in the morning.      cyclobenzaprine 10 MG Oral Tab Take 1 tablet (10 mg total) by mouth 3 (three) times daily as needed for Muscle spasms. 60 tablet 1    montelukast (SINGULAIR) 10 MG Oral Tab Take 1 tablet (10 mg total) by mouth daily. 90 tablet 3    amLODIPine 5 MG Oral Tab Take 1 tablet (5 mg total) by mouth daily. 90 tablet 3    Ibuprofen (ADVIL) 200 MG Oral Cap Take 1-2 capsules (200-400 mg total) by mouth as needed.      lidocaine 5 % External Patch Place 2 patches onto the skin in the morning. 60 patch 1    cetirizine 10 MG Oral Tab Take 1 tablet (10 mg total) by mouth in the morning.

## 2025-04-25 LAB
ATRIAL RATE: 57 BPM
ATRIAL RATE: 63 BPM
P AXIS: 46 DEGREES
P AXIS: 69 DEGREES
P-R INTERVAL: 188 MS
P-R INTERVAL: 196 MS
Q-T INTERVAL: 442 MS
Q-T INTERVAL: 446 MS
QRS DURATION: 86 MS
QRS DURATION: 86 MS
QTC CALCULATION (BEZET): 434 MS
QTC CALCULATION (BEZET): 452 MS
R AXIS: 25 DEGREES
R AXIS: 47 DEGREES
T AXIS: 44 DEGREES
T AXIS: 49 DEGREES
VENTRICULAR RATE: 57 BPM
VENTRICULAR RATE: 63 BPM

## 2025-04-29 ENCOUNTER — OFFICE VISIT (OUTPATIENT)
Dept: FAMILY MEDICINE CLINIC | Facility: CLINIC | Age: 75
End: 2025-04-29
Payer: MEDICARE

## 2025-04-29 VITALS
OXYGEN SATURATION: 98 % | RESPIRATION RATE: 16 BRPM | HEART RATE: 60 BPM | WEIGHT: 160 LBS | BODY MASS INDEX: 23.7 KG/M2 | SYSTOLIC BLOOD PRESSURE: 136 MMHG | DIASTOLIC BLOOD PRESSURE: 75 MMHG | HEIGHT: 69 IN

## 2025-04-29 DIAGNOSIS — I10 PRIMARY HYPERTENSION: ICD-10-CM

## 2025-04-29 DIAGNOSIS — R07.9 RIGHT-SIDED CHEST PAIN: Primary | ICD-10-CM

## 2025-04-29 PROCEDURE — 99214 OFFICE O/P EST MOD 30 MIN: CPT | Performed by: FAMILY MEDICINE

## 2025-04-29 RX ORDER — ATORVASTATIN CALCIUM 20 MG/1
20 TABLET, FILM COATED ORAL NIGHTLY
COMMUNITY
Start: 2025-04-29

## 2025-04-29 NOTE — PROGRESS NOTES
Chief Complaint   Patient presents with    ER F/U     Patient here for ER follow up  4/23/25 Renny Oshea DO  Chest pain of uncertain etiology      HPI:   Luigi Walden is a 74 year old male with PMH HTN, prostate CA who presents to the office for ER follow up.      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  Initial surgery in May 2023, and additional surgery to get the margins later that month.  Imaging with hypodensities on CT/PET scan.  F/u MRI did not show anything suspicious.  Cancer free for 2 yrs, gets scans and labs q6mo now, eventually will fall back to annually.      He was walking in [place], and felt a hot/burning pain in the R chest and through to his back.  Pain was severe.  Several minutes later, it did not improve, and was actually getting a little worse.   Workup in the ER including EKG, labs, imaging was negative.  Given aspirin, and the pains resolved.  Has not recurred since.    Cannot think of any inciting injury or event that triggered this.     Went to heart team this AM - they are planning a workup.  Put on statin.      BP - slowly edging up over time.  Today, 150/80.  Earlier at the cardiology office, 140/84.    REVIEW OF SYSTEMS:   Pertinent items are noted in HPI.  EXAM:   /75   Pulse 60   Resp 16   Ht 5' 9\" (1.753 m)   Wt 160 lb (72.6 kg)   SpO2 98%   BMI 23.63 kg/m²     General appearance - alert, well appearing, and in no distress  Chest - clear to auscultation, no wheezes, rales or rhonchi, symmetric air entry  Heart - normal rate, regular rhythm, normal S1, S2, no murmurs, rubs, clicks or gallops  Chest wall - non tender     CT chest 4/23/25: CONCLUSION:  There is no pulmonary embolism to the first subsegmental arterial level.    There is some questionable wall thickening of the stomach.  This may be due to incomplete distension or gastritis.  Correlation for symptoms and/or with direct visualization is suggested.   .  Component      Latest  Ref [facility] 4/23/2025   WBC      4.0 - 11.0 x10(3) uL 5.6    RBC      3.80 - 5.80 x10(6)uL 4.31    Hemoglobin      13.0 - 17.5 g/dL 14.2    Hematocrit      39.0 - 53.0 % 40.7    Platelet Count      150.0 - 450.0 10(3)uL 182.0    MCV      80.0 - 100.0 fL 94.4    MCH      26.0 - 34.0 pg 32.9    MCHC      31.0 - 37.0 g/dL 34.9    RDW      % 12.3    Prelim Neutrophil Abs      1.50 - 7.70 x10 (3) uL 3.49    Neutrophils Absolute      1.50 - 7.70 x10(3) uL 3.49    Lymphocytes Absolute      1.00 - 4.00 x10(3) uL 1.40    Monocytes Absolute      0.10 - 1.00 x10(3) uL 0.46    Eosinophils Absolute      0.00 - 0.70 x10(3) uL 0.20    Basophils Absolute      0.00 - 0.20 x10(3) uL 0.02    Immature Granulocyte Absolute      0.00 - 1.00 x10(3) uL 0.01    Neutrophils %      % 62.5    Lymphocytes %      % 25.1    Monocytes %      % 8.2    Eosinophils %      % 3.6    Basophils %      % 0.4    Immature Granulocyte %      % 0.2    Glucose      70 - 99 mg/dL 120 (H)    Sodium      136 - 145 mmol/L 143    Potassium      3.5 - 5.1 mmol/L 4.0    Chloride      98 - 112 mmol/L 107    Carbon Dioxide, Total      21.0 - 32.0 mmol/L 25.0    ANION GAP      0 - 18 mmol/L 11    BUN      9 - 23 mg/dL 18    CREATININE      0.70 - 1.30 mg/dL 1.14    CALCIUM      8.7 - 10.6 mg/dL 9.4    CALCULATED OSMOLALITY      275 - 295 mOsm/kg 299 (H)    EGFR      >=60 mL/min/1.73m2 67    AST (SGOT)      <34 U/L 25    ALT (SGPT)      10 - 49 U/L 20    ALKALINE PHOSPHATASE      45 - 117 U/L 96    Total Bilirubin      0.2 - 1.1 mg/dL 0.5    PROTEIN, TOTAL      5.7 - 8.2 g/dL 6.6    Albumin      3.2 - 4.8 g/dL 4.6    Globulin      2.0 - 3.5 g/dL 2.0    A/G Ratio      1.0 - 2.0  2.3 (H)    PT      11.6 - 14.8 seconds 13.6    INR      0.80 - 1.20  1.03    Troponin I (High Sensitivity)      <=53 ng/L 6    Troponin I (High Sensitivity)       4    Lipase      12 - 53 U/L 37    APTT      23.0 - 36.0 seconds 23.5    pro-BETA NATRIURETIC PEPTIDE      <450 pg/mL 44       Legend:  (H)  High  ASSESSMENT AND PLAN:     Luigi Walden was seen in the office today:  had concerns including ER F/U (Patient here for ER follow up/4/23/25 Renny Oshea, /Chest pain of uncertain etiology).    1. Right-sided chest pain  No clear cause  S/p ER workup, which was reassuring  Interesting the pain resolved very shortly after aspirin administration  Imaging with no pulmonary or GI cause  Heart workup will continue with the cardiology team.  Watch for recurrence.     2. Primary hypertension  BP better on recheck  Running a little higher in general.  Taking amlodipine 5mg.          Deacon Stanton M.D.   EMG 3  04/29/25          Note to patient: The 21 Century Cures Act makes medical notes like these available to patients in the interest of transparency. However, be advised this is a medical document. It is intended as peer to peer communication. It is written in medical language and may contain abbreviations or verbiage that are unfamiliar. It may appear blunt or direct. Medical documents are intended to carry relevant information, facts as evident, and the clinical opinion of the practitioner.

## 2025-05-29 ENCOUNTER — TELEPHONE (OUTPATIENT)
Dept: FAMILY MEDICINE CLINIC | Facility: CLINIC | Age: 75
End: 2025-05-29

## 2025-05-29 DIAGNOSIS — R91.1 LUNG NODULE SEEN ON IMAGING STUDY: Primary | ICD-10-CM

## 2025-05-29 NOTE — TELEPHONE ENCOUNTER
Over read from coronary CT shows R middle lobe nodule over 10 mm in size. Kiley LARSON to contact patient regarding results.

## 2025-05-29 NOTE — TELEPHONE ENCOUNTER
Very interesting.   We had CTA just a little ago that showed nothing.     Might need to put in a referral to see the pulm team. Dr. Styles would be my choice.

## 2025-05-29 NOTE — TELEPHONE ENCOUNTER
Slade alvarado [facility] is calling with abnormal test results

## 2025-06-05 ENCOUNTER — OFFICE VISIT (OUTPATIENT)
Dept: PHYSICAL MEDICINE AND REHAB | Facility: CLINIC | Age: 75
End: 2025-06-05
Payer: MEDICARE

## 2025-06-05 ENCOUNTER — TELEPHONE (OUTPATIENT)
Dept: PHYSICAL MEDICINE AND REHAB | Facility: CLINIC | Age: 75
End: 2025-06-05

## 2025-06-05 VITALS — BODY MASS INDEX: 22.96 KG/M2 | HEIGHT: 69 IN | WEIGHT: 155 LBS

## 2025-06-05 DIAGNOSIS — M70.71 ILIOPSOAS BURSITIS OF RIGHT HIP: Primary | ICD-10-CM

## 2025-06-05 PROCEDURE — 20611 DRAIN/INJ JOINT/BURSA W/US: CPT | Performed by: PHYSICAL MEDICINE & REHABILITATION

## 2025-06-05 PROCEDURE — 99214 OFFICE O/P EST MOD 30 MIN: CPT | Performed by: PHYSICAL MEDICINE & REHABILITATION

## 2025-06-05 RX ORDER — LIDOCAINE HYDROCHLORIDE 10 MG/ML
5 INJECTION, SOLUTION INFILTRATION; PERINEURAL ONCE
Status: COMPLETED | OUTPATIENT
Start: 2025-06-05 | End: 2025-06-05

## 2025-06-05 RX ORDER — TRIAMCINOLONE ACETONIDE 40 MG/ML
40 INJECTION, SUSPENSION INTRA-ARTICULAR; INTRAMUSCULAR ONCE
Status: COMPLETED | OUTPATIENT
Start: 2025-06-05 | End: 2025-06-05

## 2025-06-05 RX ADMIN — TRIAMCINOLONE ACETONIDE 40 MG: 40 INJECTION, SUSPENSION INTRA-ARTICULAR; INTRAMUSCULAR at 14:05:00

## 2025-06-05 RX ADMIN — LIDOCAINE HYDROCHLORIDE 5 ML: 10 INJECTION, SOLUTION INFILTRATION; PERINEURAL at 14:04:00

## 2025-06-05 NOTE — TELEPHONE ENCOUNTER
Per CMS Guidelines- no authorization is required for Right iliopsoas bursa injection ultrasound guidance, local anesthesia.   CPT: 87340,   Dx: M70.71    Status: authorization is not required based on medical necessity however may by subject to review once claim is submitted.    Injection done in office.

## 2025-06-05 NOTE — PROCEDURES
PROCEDURE NOTE    DIAGNOSIS  Right hip pain    PROCEDURE  Ultrasound guided Right iliopsoas bursa injection    PERFORMING PHYSICIAN  Gary Ryan DO    PROCEDURE NOTE  Informed consent was obtained. Risks and benefits of the procedure were explained. The patient was placed in a supine position and the Right femoral head neck junction, iliopsoas tendon and associated bursa was identified under ultrasound using the curvilinear transducer. The area was prepped with Betadine x 6 and alcohol swab. Sterile ultrasound probe cover was used. Sterile ultrasound gel was applied. A 27-gauge 1.5 inch needle was inserted into this area and 1-2 mL of 1% lidocaine was infused subcutaneously to anesthetize the region. Then, a 22-gauge 3.5 inch needle was advanced under ultrasound guidance to the target, using an in-plane approach. Then, 1 mL of 40 mg/mL Triamcinolone and 2 mL of 1% Lidocaine was infused. Injectate was seen expanding the bursal space. The patient tolerated the procedure without complications. Post procedure instructions were provided.        Gary Ryan DO  Physical Medicine and Rehabilitation  Interventional Spine and Sports Medicine   [facility]

## 2025-06-08 DIAGNOSIS — I10 ESSENTIAL HYPERTENSION: ICD-10-CM

## 2025-06-09 RX ORDER — AMLODIPINE BESYLATE 5 MG/1
5 TABLET ORAL DAILY
Qty: 90 TABLET | Refills: 3 | Status: SHIPPED | OUTPATIENT
Start: 2025-06-09

## 2025-06-09 NOTE — TELEPHONE ENCOUNTER
Requested Prescriptions     Pending Prescriptions Disp Refills    AMLODIPINE 5 MG Oral Tab [Pharmacy Med Name: Amlodipine Besylate 5 Mg Tab Unic] 90 tablet 0     Sig: Take 1 tablet (5 mg total) by mouth daily.      Refilled per protocol/OV notes

## 2025-08-04 ENCOUNTER — TELEPHONE (OUTPATIENT)
Dept: ORTHOPEDICS CLINIC | Facility: CLINIC | Age: 75
End: 2025-08-04

## 2025-08-04 ENCOUNTER — HOSPITAL ENCOUNTER (OUTPATIENT)
Dept: GENERAL RADIOLOGY | Age: 75
Discharge: HOME | End: 2025-08-04
Attending: FAMILY MEDICINE

## 2025-08-04 ENCOUNTER — OFFICE VISIT (OUTPATIENT)
Dept: ORTHOPEDICS CLINIC | Facility: CLINIC | Age: 75
End: 2025-08-04

## 2025-08-04 DIAGNOSIS — M25.511 RIGHT SHOULDER PAIN, UNSPECIFIED CHRONICITY: ICD-10-CM

## 2025-08-04 DIAGNOSIS — M75.21 BICEPS TENDINITIS OF RIGHT SHOULDER: Primary | ICD-10-CM

## 2025-08-04 DIAGNOSIS — M25.511 RIGHT SHOULDER PAIN, UNSPECIFIED CHRONICITY: Primary | ICD-10-CM

## 2025-08-04 PROCEDURE — 73030 X-RAY EXAM OF SHOULDER: CPT | Performed by: FAMILY MEDICINE

## 2025-08-04 PROCEDURE — 99214 OFFICE O/P EST MOD 30 MIN: CPT | Performed by: FAMILY MEDICINE

## 2025-08-04 RX ORDER — NAPROXEN 500 MG/1
500 TABLET ORAL 2 TIMES DAILY WITH MEALS
Qty: 30 TABLET | Refills: 0 | Status: SHIPPED | OUTPATIENT
Start: 2025-08-04

## 2025-08-18 ENCOUNTER — OFFICE VISIT (OUTPATIENT)
Dept: ORTHOPEDICS CLINIC | Facility: CLINIC | Age: 75
End: 2025-08-18

## 2025-08-18 DIAGNOSIS — M75.21 BICEPS TENDINITIS OF RIGHT SHOULDER: Primary | ICD-10-CM

## 2025-08-18 RX ORDER — TRIAMCINOLONE ACETONIDE 40 MG/ML
40 INJECTION, SUSPENSION INTRA-ARTICULAR; INTRAMUSCULAR ONCE
Status: COMPLETED | OUTPATIENT
Start: 2025-08-18 | End: 2025-08-18

## 2025-08-18 RX ADMIN — TRIAMCINOLONE ACETONIDE 40 MG: 40 INJECTION, SUSPENSION INTRA-ARTICULAR; INTRAMUSCULAR at 14:00:00

## (undated) DIAGNOSIS — M25.561 RIGHT KNEE PAIN, UNSPECIFIED CHRONICITY: Primary | ICD-10-CM

## (undated) NOTE — LETTER
AUTHORIZATION FOR SURGICAL OPERATION OR OTHER PROCEDURE    1. I hereby authorize Dr. Gary Ryan and the [facility] staff assigned to my case to perform the following operation and/or procedure at the [facility]:    Right iliopsoas bursa injection ultrasound guidance, local anesthesia       2.  My physician has explained the nature and purpose of the operation or other procedure, possible alternative methods of treatment, the risks involved, and the possibility of complication to me.  I acknowledge that no guarantee has been made as to the result that may be obtained.  3.  I recognize that, during the course of this operation, or other procedure, unforseen conditions may necessitate additional or different procedure than those listed above.  I, therefore, further authorize and request that the above named physician, his/her physician assistants or designees perform such procedures as are, in his/her professional opinion, necessary and desirable.  4.  Any tissue or organs removed in the operation or other procedure may be disposed of by and at the discretion of the [facility] staff and [facility].  5.  I understand that in the event of a medical emergency, I will be transported by local paramedics to [facility] or other hospital emergency department.  6.  I certify that I have read and fully understand the above consent to operation and/or other procedure.    7.  I acknowledge that my physician has explained sedation/analgesia administration to me including the risks and benefits.  I consent to the administration of sedation/analgesia as may be necessary or desirable in the judgement of my physician.    Witness signature: ___________________________________________________ Date:  ______/______/_____                    Time:  ________ A.M.  P.MAlexandra Walden  5/19/1950  [id number]         Patient signature:   ___________________________________________________              Statement of Physician  My signature below affirms that prior to the time of the procedure, I have explained to the patient and/or his/her guardian, the risks and benefits involved in the proposed treatment and any reasonable alternative to the proposed treatment.  I have also explained the risks and benefits involved in the refusal of the proposed treatment and have answered the patient's questions.                        Date:  ______/______/_______  Provider                      Signature:  __________________________________________________________       Time:  ___________ APERRY ROBLEDO

## (undated) NOTE — LETTER
Notifier: [employer]       Patient Name: John Walden       Identification Number: [id number]                          Advance Beneficiary Notice of Noncoverage (ABN)   NOTE:  If Medicare doesn’t pay for D. Items/service(s) below, you may have to pay.  Medicare does not pay for everything, even some care that you or your health care provider have good reason to think you need. We expect Medicare may not pay for the D. items/service(s) below.  Items or Services Reason Medicare May Not Pay: Estimated Cost   _     Right adductor trigger point injection, ultrasound guidance    __ Medicare does not cover this service      __ Medicare may not pay for this   item/service for your condition     __ Medicare may not pay for this item/service as often as this        WHAT YOU NEED TO DO NOW:  Read this notice, so you can make an informed decision about your care.  Ask us any questions that you may have after you finish reading.  Choose an option below about whether to receive the D. item/service(s)  listed above.  Note: If you choose Option 1 or 2, we may help you to use any other insurance that you might have, but Medicare cannot require us to do this.  OPTIONS: Check only one box.  We cannot choose a box for you.   OPTION 1. I want the D. item/service(s) listed above. You may ask to be paid now, but I also want Medicare billed for an official decision on payment, which is sent to me on a Medicare Summary Notice (MSN). I understand that if Medicare doesn’t pay, I am responsible for payment, but I can appeal to Medicare by following the directions on the MSN. If Medicare does pay, you will refund any payments I made to you, less co-pays or deductibles.  OPTION 2. I want the D. item/service(s) listed above, but do not bill Medicare. You may ask to be paid now as I am responsible for payment. I cannot appeal if Medicare is not billed.  OPTION 3. I don't want the D. item/service(s) listed above. I understand with  this choice I am not responsible for payment, and I cannot appeal to see if Medicare would pay.    H. Additional Information:    This notice gives our opinion, not an official Medicare decision. If you have other questions on this notice or Medicare billing, call 1-800-MEDICARE ([phone]/TTY: [phone]). Signing below means that you have received and understand this notice. You also receive a copy.  Signature: Date:       You have the right to get Medicare information in an accessible format, like large print, Braille, or audio. You also have the right to file a complaint if you feel you’ve been discriminated against. Visit Medicare.gov/about- us/xgfpvvjakdacv-hqiecwoslydumtyxz-rawygs.  According to the Paperwork Reduction Act of 1995, no persons are required to respond to a collection of information unless it displays a valid OMB control number. The valid OMB control number for this information collection is 4966-6760. The time required to complete this information collection is estimated to average 7 minutes per response, including the time to review instructions, search existing data resources, gather the data needed, and complete and review the information collection. If you have comments concerning the accuracy of the time estimate or suggestions for improving this form, please write to: CMS, [address]     Javier, Galn: STEPHANIE Reports Clearance Officer, [place], [place] [address].  Form CMS-R-131 (Exp. 1/31/2026) Form Approved OMB No. 3216-1835

## (undated) NOTE — LETTER
AUTHORIZATION FOR SURGICAL OPERATION OR OTHER PROCEDURE    1. I hereby authorize Dr. Gary Ryan and the [facility] staff assigned to my case to perform the following operation and/or procedure at the [facility]:     Right adductor trigger point injection, ultrasound guidance     2.  My physician has explained the nature and purpose of the operation or other procedure, possible alternative methods of treatment, the risks involved, and the possibility of complication to me.  I acknowledge that no guarantee has been made as to the result that may be obtained.  3.  I recognize that, during the course of this operation, or other procedure, unforseen conditions may necessitate additional or different procedure than those listed above.  I, therefore, further authorize and request that the above named physician, his/her physician assistants or designees perform such procedures as are, in his/her professional opinion, necessary and desirable.  4.  Any tissue or organs removed in the operation or other procedure may be disposed of by and at the discretion of the [facility] staff and [facility].  5.  I understand that in the event of a medical emergency, I will be transported by local paramedics to [facility] or other hospital emergency department.  6.  I certify that I have read and fully understand the above consent to operation and/or other procedure.    7.  I acknowledge that my physician has explained sedation/analgesia administration to me including the risks and benefits.  I consent to the administration of sedation/analgesia as may be necessary or desirable in the judgement of my physician.    Witness signature: ___________________________________________________ Date:  ______/______/_____                    Time:  ________ A.M.  P.M.       Patient Name:  Luigi Walden  5/19/1950  [id number]       Patient signature:   ___________________________________________________                   Statement of Physician  My signature below affirms that prior to the time of the procedure, I have explained to the patient and/or his/her guardian, the risks and benefits involved in the proposed treatment and any reasonable alternative to the proposed treatment.  I have also explained the risks and benefits involved in the refusal of the proposed treatment and have answered the patient's questions.                        Date:  ______/______/_______  Provider                      Signature:  __________________________________________________________       Time:  ___________ APERRY ROBLEDO

## (undated) NOTE — Clinical Note
Thank you for allowing me to serve in the care of your patient, Fredy Blanca at [address] [address] on 1/27/2018. We value our relationship with you and appreciate your confidence in our [facility] service and staff.

## (undated) NOTE — LETTER
ANMOL Notifier: CoverMyMeds. Patient Name: John Walden Identification Number: [id number]                          Advance Beneficiary Notice of Noncoverage (ABN)   NOTE:  If Medicare doesn’t pay for D. item/service(s) below, you may have to pay.  Medicare does not pay for everything, even some care that you or your health care provider have good reason to think you need. We expect Medicare may not pay for the D. item/service(s) below.  D. Items or Services E. Reason Medicare May Not Pay: F. Estimated Cost   Right iliopsoas bursa injection ultrasound guidance, local anesthesia      __ Medicare does not cover this service      __ Medicare may not pay for this   item/service for your condition     __ Medicare may not pay for this item/service as often as this        WHAT YOU NEED TO DO NOW:  Read this notice, so you can make an informed decision about your care.  Ask us any questions that you may have after you finish reading.  Choose an option below about whether to receive the D. item/service(s) listed above.  Note: If you choose Option 1 or 2, we may help you to use any other insurance that you might have, but Medicare cannot require us to do this.  G. OPTIONS: Check only one box.  We cannot choose a box for you.   OPTION 1. I want the D. item/service(s) listed above. You may ask to be paid now, but I also want Medicare billed for an official decision on payment, which is sent to me on a Medicare Summary Notice (MSN). I understand that if Medicare doesn’t pay, I am responsible for payment, but I can appeal to Medicare by following the directions on the MSN. If Medicare does pay, you will refund any payments I made to you, less co-pays or deductibles.  OPTION 2. I want the D. item/service(s) listed above, but do not bill Medicare. You may ask to be paid now as I am responsible for payment. I cannot appeal if Medicare is not billed.  OPTION 3. I don't want the D. item/service(s) listed  above. I understand with this choice I am not responsible for payment, and I cannot appeal to see if Medicare would pay.    H. Additional Information:    This notice gives our opinion, not an official Medicare decision. If you have other questions on this notice or Medicare billing, call 1-800-MEDICARE (1-900.692.9176/TTY: [phone]). Signing below means that you have received and understand this notice. You also receive a copy.  I. Signature: J. Date:       You have the right to get Medicare information in an accessible format, like large print, Braille, or audio. You also have the right to file a complaint if you feel you’ve been discriminated against. Visit Medicare.gov/about- us/smpanarggmfrw-feaxmzftocnhxeyvw-cbukkc.  According to the Paperwork Reduction Act of 1995, no persons are required to respond to a collection of information unless it displays a valid OMB control number. The valid OMB control number for this information collection is 3204-4611. The time required to complete this information collection is estimated to average 7 minutes per response, including the time to review instructions, search existing data resources, gather the data needed, and complete and review the information collection. If you have comments concerning the accuracy of the time estimate or suggestions for improving this form, please write to: CMS, [address]     [address] [place], Attn: STEPHANIE Reports Clearance Officer, [place], [place] [address].  Form CMS-R-131 (Exp. 1/31/2026) Form Approved OMB No. [id number]